# Patient Record
Sex: MALE | Race: BLACK OR AFRICAN AMERICAN | NOT HISPANIC OR LATINO | Employment: FULL TIME | URBAN - METROPOLITAN AREA
[De-identification: names, ages, dates, MRNs, and addresses within clinical notes are randomized per-mention and may not be internally consistent; named-entity substitution may affect disease eponyms.]

---

## 2017-01-13 ENCOUNTER — OFFICE VISIT - HEALTHEAST (OUTPATIENT)
Dept: FAMILY MEDICINE | Facility: CLINIC | Age: 47
End: 2017-01-13

## 2017-01-13 DIAGNOSIS — M19.90 OSTEOARTHRITIS: ICD-10-CM

## 2017-01-13 DIAGNOSIS — J30.9 ALLERGIC RHINITIS: ICD-10-CM

## 2017-01-13 DIAGNOSIS — Z00.00 PHYSICAL EXAM: ICD-10-CM

## 2017-01-13 DIAGNOSIS — E66.3 OVERWEIGHT: ICD-10-CM

## 2017-01-13 LAB
CHOLEST SERPL-MCNC: 170 MG/DL
FASTING STATUS PATIENT QL REPORTED: YES
HDLC SERPL-MCNC: 55 MG/DL
LDLC SERPL CALC-MCNC: 94 MG/DL
TRIGL SERPL-MCNC: 104 MG/DL

## 2017-01-13 ASSESSMENT — MIFFLIN-ST. JEOR: SCORE: 1809.93

## 2017-01-14 ENCOUNTER — COMMUNICATION - HEALTHEAST (OUTPATIENT)
Dept: FAMILY MEDICINE | Facility: CLINIC | Age: 47
End: 2017-01-14

## 2017-01-16 ENCOUNTER — COMMUNICATION - HEALTHEAST (OUTPATIENT)
Dept: FAMILY MEDICINE | Facility: CLINIC | Age: 47
End: 2017-01-16

## 2017-04-10 ENCOUNTER — COMMUNICATION - HEALTHEAST (OUTPATIENT)
Dept: FAMILY MEDICINE | Facility: CLINIC | Age: 47
End: 2017-04-10

## 2017-04-10 ENCOUNTER — AMBULATORY - HEALTHEAST (OUTPATIENT)
Dept: FAMILY MEDICINE | Facility: CLINIC | Age: 47
End: 2017-04-10

## 2017-04-10 DIAGNOSIS — Z88.9 MULTIPLE ALLERGIES: ICD-10-CM

## 2017-09-12 ENCOUNTER — COMMUNICATION - HEALTHEAST (OUTPATIENT)
Dept: SCHEDULING | Facility: CLINIC | Age: 47
End: 2017-09-12

## 2018-01-23 ENCOUNTER — OFFICE VISIT - HEALTHEAST (OUTPATIENT)
Dept: FAMILY MEDICINE | Facility: CLINIC | Age: 48
End: 2018-01-23

## 2018-01-23 DIAGNOSIS — J30.9 ALLERGIC RHINITIS: ICD-10-CM

## 2018-01-23 DIAGNOSIS — M19.90 OSTEOARTHRITIS: ICD-10-CM

## 2018-01-23 DIAGNOSIS — Z00.00 PHYSICAL EXAM: ICD-10-CM

## 2018-01-23 LAB
ALBUMIN SERPL-MCNC: 3.8 G/DL (ref 3.5–5)
ALP SERPL-CCNC: 65 U/L (ref 45–120)
ALT SERPL W P-5'-P-CCNC: 17 U/L (ref 0–45)
ANION GAP SERPL CALCULATED.3IONS-SCNC: 6 MMOL/L (ref 5–18)
AST SERPL W P-5'-P-CCNC: 17 U/L (ref 0–40)
BILIRUB SERPL-MCNC: 0.7 MG/DL (ref 0–1)
BUN SERPL-MCNC: 21 MG/DL (ref 8–22)
CALCIUM SERPL-MCNC: 9.2 MG/DL (ref 8.5–10.5)
CHLORIDE BLD-SCNC: 107 MMOL/L (ref 98–107)
CHOLEST SERPL-MCNC: 187 MG/DL
CO2 SERPL-SCNC: 30 MMOL/L (ref 22–31)
CREAT SERPL-MCNC: 1.19 MG/DL (ref 0.7–1.3)
FASTING STATUS PATIENT QL REPORTED: YES
GFR SERPL CREATININE-BSD FRML MDRD: >60 ML/MIN/1.73M2
GLUCOSE BLD-MCNC: 84 MG/DL (ref 70–125)
HDLC SERPL-MCNC: 63 MG/DL
LDLC SERPL CALC-MCNC: 108 MG/DL
POTASSIUM BLD-SCNC: 4.7 MMOL/L (ref 3.5–5)
PROT SERPL-MCNC: 6.5 G/DL (ref 6–8)
SODIUM SERPL-SCNC: 143 MMOL/L (ref 136–145)
TRIGL SERPL-MCNC: 78 MG/DL

## 2018-01-23 ASSESSMENT — MIFFLIN-ST. JEOR: SCORE: 1797.47

## 2018-01-24 ENCOUNTER — COMMUNICATION - HEALTHEAST (OUTPATIENT)
Dept: FAMILY MEDICINE | Facility: CLINIC | Age: 48
End: 2018-01-24

## 2018-02-01 ENCOUNTER — COMMUNICATION - HEALTHEAST (OUTPATIENT)
Dept: FAMILY MEDICINE | Facility: CLINIC | Age: 48
End: 2018-02-01

## 2018-02-22 ENCOUNTER — RECORDS - HEALTHEAST (OUTPATIENT)
Dept: ADMINISTRATIVE | Facility: OTHER | Age: 48
End: 2018-02-22

## 2018-07-11 ENCOUNTER — AMBULATORY - HEALTHEAST (OUTPATIENT)
Dept: FAMILY MEDICINE | Facility: CLINIC | Age: 48
End: 2018-07-11

## 2018-07-11 ENCOUNTER — COMMUNICATION - HEALTHEAST (OUTPATIENT)
Dept: FAMILY MEDICINE | Facility: CLINIC | Age: 48
End: 2018-07-11

## 2018-07-11 DIAGNOSIS — Z88.9 MULTIPLE ALLERGIES: ICD-10-CM

## 2018-08-07 ENCOUNTER — COMMUNICATION - HEALTHEAST (OUTPATIENT)
Dept: FAMILY MEDICINE | Facility: CLINIC | Age: 48
End: 2018-08-07

## 2018-08-07 ENCOUNTER — OFFICE VISIT - HEALTHEAST (OUTPATIENT)
Dept: FAMILY MEDICINE | Facility: CLINIC | Age: 48
End: 2018-08-07

## 2018-08-07 DIAGNOSIS — R53.83 FATIGUE: ICD-10-CM

## 2018-08-07 DIAGNOSIS — R35.0 URINARY FREQUENCY: ICD-10-CM

## 2018-08-07 LAB
ALBUMIN UR-MCNC: NEGATIVE MG/DL
ANION GAP SERPL CALCULATED.3IONS-SCNC: 10 MMOL/L (ref 5–18)
APPEARANCE UR: CLEAR
BILIRUB UR QL STRIP: NEGATIVE
BUN SERPL-MCNC: 13 MG/DL (ref 8–22)
CALCIUM SERPL-MCNC: 9.3 MG/DL (ref 8.5–10.5)
CHLORIDE BLD-SCNC: 102 MMOL/L (ref 98–107)
CO2 SERPL-SCNC: 27 MMOL/L (ref 22–31)
COLOR UR AUTO: YELLOW
CREAT SERPL-MCNC: 1.33 MG/DL (ref 0.7–1.3)
GFR SERPL CREATININE-BSD FRML MDRD: 57 ML/MIN/1.73M2
GLUCOSE BLD-MCNC: 91 MG/DL (ref 70–125)
GLUCOSE UR STRIP-MCNC: NEGATIVE MG/DL
HGB UR QL STRIP: NEGATIVE
KETONES UR STRIP-MCNC: NEGATIVE MG/DL
LEUKOCYTE ESTERASE UR QL STRIP: NEGATIVE
NITRATE UR QL: NEGATIVE
PH UR STRIP: 7 [PH] (ref 5–8)
POTASSIUM BLD-SCNC: 4.3 MMOL/L (ref 3.5–5)
SODIUM SERPL-SCNC: 139 MMOL/L (ref 136–145)
SP GR UR STRIP: 1.01 (ref 1–1.03)
UROBILINOGEN UR STRIP-ACNC: NORMAL

## 2018-08-08 ENCOUNTER — COMMUNICATION - HEALTHEAST (OUTPATIENT)
Dept: FAMILY MEDICINE | Facility: CLINIC | Age: 48
End: 2018-08-08

## 2018-08-08 LAB — BACTERIA SPEC CULT: NO GROWTH

## 2018-08-24 ENCOUNTER — COMMUNICATION - HEALTHEAST (OUTPATIENT)
Dept: FAMILY MEDICINE | Facility: CLINIC | Age: 48
End: 2018-08-24

## 2020-02-07 ENCOUNTER — OFFICE VISIT - HEALTHEAST (OUTPATIENT)
Dept: FAMILY MEDICINE | Facility: CLINIC | Age: 50
End: 2020-02-07

## 2020-02-07 DIAGNOSIS — Z12.5 SCREENING FOR PROSTATE CANCER: ICD-10-CM

## 2020-02-07 DIAGNOSIS — Z00.00 PHYSICAL EXAM: ICD-10-CM

## 2020-02-07 DIAGNOSIS — Z23 NEED FOR VACCINATION: ICD-10-CM

## 2020-02-07 DIAGNOSIS — M16.11 OSTEOARTHRITIS OF RIGHT HIP, UNSPECIFIED OSTEOARTHRITIS TYPE: ICD-10-CM

## 2020-02-07 DIAGNOSIS — Z13.220 SCREENING FOR LIPID DISORDERS: ICD-10-CM

## 2020-02-07 DIAGNOSIS — J30.9 ALLERGIC RHINITIS, UNSPECIFIED SEASONALITY, UNSPECIFIED TRIGGER: ICD-10-CM

## 2020-02-07 ASSESSMENT — MIFFLIN-ST. JEOR: SCORE: 1838.06

## 2020-02-10 ENCOUNTER — COMMUNICATION - HEALTHEAST (OUTPATIENT)
Dept: FAMILY MEDICINE | Facility: CLINIC | Age: 50
End: 2020-02-10

## 2020-02-10 ENCOUNTER — AMBULATORY - HEALTHEAST (OUTPATIENT)
Dept: LAB | Facility: CLINIC | Age: 50
End: 2020-02-10

## 2020-02-10 DIAGNOSIS — Z12.5 SCREENING FOR PROSTATE CANCER: ICD-10-CM

## 2020-02-10 DIAGNOSIS — J30.9 ALLERGIC RHINITIS, UNSPECIFIED SEASONALITY, UNSPECIFIED TRIGGER: ICD-10-CM

## 2020-02-10 DIAGNOSIS — Z00.00 PHYSICAL EXAM: ICD-10-CM

## 2020-02-10 DIAGNOSIS — Z13.220 SCREENING FOR LIPID DISORDERS: ICD-10-CM

## 2020-02-10 LAB
ALBUMIN SERPL-MCNC: 4.1 G/DL (ref 3.5–5)
ALBUMIN UR-MCNC: NEGATIVE MG/DL
ALP SERPL-CCNC: 70 U/L (ref 45–120)
ALT SERPL W P-5'-P-CCNC: 19 U/L (ref 0–45)
ANION GAP SERPL CALCULATED.3IONS-SCNC: 11 MMOL/L (ref 5–18)
APPEARANCE UR: CLEAR
AST SERPL W P-5'-P-CCNC: 19 U/L (ref 0–40)
BACTERIA #/AREA URNS HPF: NORMAL HPF
BILIRUB SERPL-MCNC: 0.7 MG/DL (ref 0–1)
BILIRUB UR QL STRIP: NEGATIVE
BUN SERPL-MCNC: 16 MG/DL (ref 8–22)
CALCIUM SERPL-MCNC: 9.4 MG/DL (ref 8.5–10.5)
CHLORIDE BLD-SCNC: 104 MMOL/L (ref 98–107)
CHOLEST SERPL-MCNC: 196 MG/DL
CO2 SERPL-SCNC: 26 MMOL/L (ref 22–31)
COLOR UR AUTO: YELLOW
CREAT SERPL-MCNC: 1.3 MG/DL (ref 0.7–1.3)
ERYTHROCYTE [DISTWIDTH] IN BLOOD BY AUTOMATED COUNT: 11.8 % (ref 11–14.5)
FASTING STATUS PATIENT QL REPORTED: YES
GFR SERPL CREATININE-BSD FRML MDRD: 58 ML/MIN/1.73M2
GLUCOSE BLD-MCNC: 82 MG/DL (ref 70–125)
GLUCOSE UR STRIP-MCNC: NEGATIVE MG/DL
HCT VFR BLD AUTO: 45 % (ref 40–54)
HDLC SERPL-MCNC: 69 MG/DL
HGB BLD-MCNC: 15.4 G/DL (ref 14–18)
HGB UR QL STRIP: NEGATIVE
KETONES UR STRIP-MCNC: NEGATIVE MG/DL
LDLC SERPL CALC-MCNC: 110 MG/DL
LEUKOCYTE ESTERASE UR QL STRIP: NEGATIVE
MCH RBC QN AUTO: 32.2 PG (ref 27–34)
MCHC RBC AUTO-ENTMCNC: 34.2 G/DL (ref 32–36)
MCV RBC AUTO: 94 FL (ref 80–100)
NITRATE UR QL: NEGATIVE
PH UR STRIP: 6 [PH] (ref 5–8)
PLATELET # BLD AUTO: 197 THOU/UL (ref 140–440)
PMV BLD AUTO: 7.9 FL (ref 7–10)
POTASSIUM BLD-SCNC: 3.9 MMOL/L (ref 3.5–5)
PROT SERPL-MCNC: 6.3 G/DL (ref 6–8)
PSA SERPL-MCNC: 0.8 NG/ML (ref 0–3.5)
RBC # BLD AUTO: 4.78 MILL/UL (ref 4.4–6.2)
RBC #/AREA URNS AUTO: NORMAL HPF
SODIUM SERPL-SCNC: 141 MMOL/L (ref 136–145)
SP GR UR STRIP: 1.02 (ref 1–1.03)
SQUAMOUS #/AREA URNS AUTO: NORMAL LPF
TRIGL SERPL-MCNC: 85 MG/DL
UROBILINOGEN UR STRIP-ACNC: NORMAL
WBC #/AREA URNS AUTO: NORMAL HPF
WBC: 4.1 THOU/UL (ref 4–11)

## 2020-02-11 ENCOUNTER — COMMUNICATION - HEALTHEAST (OUTPATIENT)
Dept: FAMILY MEDICINE | Facility: CLINIC | Age: 50
End: 2020-02-11

## 2020-02-11 LAB — BACTERIA SPEC CULT: NO GROWTH

## 2020-10-22 ENCOUNTER — COMMUNICATION - HEALTHEAST (OUTPATIENT)
Dept: INTERNAL MEDICINE | Facility: CLINIC | Age: 50
End: 2020-10-22

## 2020-10-22 DIAGNOSIS — Z23 NEED FOR INFLUENZA VACCINATION: ICD-10-CM

## 2020-10-22 DIAGNOSIS — Z23 NEED FOR SHINGLES VACCINE: ICD-10-CM

## 2020-11-10 ENCOUNTER — OFFICE VISIT - HEALTHEAST (OUTPATIENT)
Dept: FAMILY MEDICINE | Facility: CLINIC | Age: 50
End: 2020-11-10

## 2020-11-10 DIAGNOSIS — Z23 NEED FOR VACCINATION: ICD-10-CM

## 2020-11-10 DIAGNOSIS — R10.30 LOWER ABDOMINAL PAIN: ICD-10-CM

## 2021-04-02 ENCOUNTER — OFFICE VISIT - HEALTHEAST (OUTPATIENT)
Dept: FAMILY MEDICINE | Facility: CLINIC | Age: 51
End: 2021-04-02

## 2021-04-02 DIAGNOSIS — M16.11 OSTEOARTHRITIS OF RIGHT HIP, UNSPECIFIED OSTEOARTHRITIS TYPE: ICD-10-CM

## 2021-04-02 DIAGNOSIS — Z12.5 SCREENING FOR PROSTATE CANCER: ICD-10-CM

## 2021-04-02 DIAGNOSIS — H61.23 BILATERAL IMPACTED CERUMEN: ICD-10-CM

## 2021-04-02 DIAGNOSIS — J30.9 ALLERGIC RHINITIS, UNSPECIFIED SEASONALITY, UNSPECIFIED TRIGGER: ICD-10-CM

## 2021-04-02 DIAGNOSIS — Z00.00 PHYSICAL EXAM: ICD-10-CM

## 2021-04-02 DIAGNOSIS — Z13.220 SCREENING FOR LIPID DISORDERS: ICD-10-CM

## 2021-04-02 RX ORDER — EPINEPHRINE 0.3 MG/.3ML
0.3 INJECTION SUBCUTANEOUS PRN
Qty: 2 | Refills: 1 | Status: SHIPPED | OUTPATIENT
Start: 2021-04-02 | End: 2022-06-30

## 2021-04-02 ASSESSMENT — MIFFLIN-ST. JEOR: SCORE: 1821.05

## 2021-04-08 ENCOUNTER — AMBULATORY - HEALTHEAST (OUTPATIENT)
Dept: LAB | Facility: CLINIC | Age: 51
End: 2021-04-08

## 2021-04-08 DIAGNOSIS — Z13.220 SCREENING FOR LIPID DISORDERS: ICD-10-CM

## 2021-04-08 DIAGNOSIS — Z12.5 SCREENING FOR PROSTATE CANCER: ICD-10-CM

## 2021-04-08 LAB
ANION GAP SERPL CALCULATED.3IONS-SCNC: 10 MMOL/L (ref 5–18)
BUN SERPL-MCNC: 12 MG/DL (ref 8–22)
CALCIUM SERPL-MCNC: 8.5 MG/DL (ref 8.5–10.5)
CHLORIDE BLD-SCNC: 104 MMOL/L (ref 98–107)
CHOLEST SERPL-MCNC: 180 MG/DL
CO2 SERPL-SCNC: 27 MMOL/L (ref 22–31)
CREAT SERPL-MCNC: 1.37 MG/DL (ref 0.7–1.3)
FASTING STATUS PATIENT QL REPORTED: YES
GFR SERPL CREATININE-BSD FRML MDRD: 55 ML/MIN/1.73M2
GLUCOSE BLD-MCNC: 100 MG/DL (ref 70–125)
HDLC SERPL-MCNC: 59 MG/DL
LDLC SERPL CALC-MCNC: 100 MG/DL
POTASSIUM BLD-SCNC: 4.5 MMOL/L (ref 3.5–5)
PSA SERPL-MCNC: 0.8 NG/ML (ref 0–3.5)
SODIUM SERPL-SCNC: 141 MMOL/L (ref 136–145)
TRIGL SERPL-MCNC: 106 MG/DL

## 2021-04-09 ENCOUNTER — AMBULATORY - HEALTHEAST (OUTPATIENT)
Dept: FAMILY MEDICINE | Facility: CLINIC | Age: 51
End: 2021-04-09

## 2021-04-09 ENCOUNTER — COMMUNICATION - HEALTHEAST (OUTPATIENT)
Dept: FAMILY MEDICINE | Facility: CLINIC | Age: 51
End: 2021-04-09

## 2021-04-09 DIAGNOSIS — R79.89 ELEVATED SERUM CREATININE: ICD-10-CM

## 2021-05-30 VITALS — BODY MASS INDEX: 27.77 KG/M2 | WEIGHT: 205 LBS | HEIGHT: 72 IN

## 2021-05-31 VITALS — WEIGHT: 204 LBS | HEIGHT: 71 IN | BODY MASS INDEX: 28.56 KG/M2

## 2021-06-01 VITALS — WEIGHT: 208.2 LBS | BODY MASS INDEX: 29.04 KG/M2

## 2021-06-04 VITALS
DIASTOLIC BLOOD PRESSURE: 80 MMHG | HEART RATE: 56 BPM | BODY MASS INDEX: 28.78 KG/M2 | WEIGHT: 209.3 LBS | SYSTOLIC BLOOD PRESSURE: 133 MMHG

## 2021-06-04 VITALS
WEIGHT: 211.2 LBS | HEART RATE: 62 BPM | BODY MASS INDEX: 28.61 KG/M2 | HEIGHT: 72 IN | SYSTOLIC BLOOD PRESSURE: 128 MMHG | DIASTOLIC BLOOD PRESSURE: 83 MMHG

## 2021-06-05 VITALS
BODY MASS INDEX: 29.29 KG/M2 | OXYGEN SATURATION: 98 % | DIASTOLIC BLOOD PRESSURE: 72 MMHG | HEART RATE: 61 BPM | WEIGHT: 209.2 LBS | SYSTOLIC BLOOD PRESSURE: 110 MMHG | HEIGHT: 71 IN

## 2021-06-05 NOTE — PROGRESS NOTES
MALE PREVENTATIVE EXAM    Assessment and Plan:       1. Physical exam  The patient overall has very good health habits.  - Urinalysis Macro & Micro; Future  - Culture, Urine; Future  - HM2(CBC w/o Differential); Future    2. Allergic rhinitis  Seasonal allergies well controlled with OTC antihistamines.    3. Osteoarthritis of right hip  Patient is status post right total hip arthroplasty overall he is doing very well.    4. Screening for prostate cancer     - PSA (Prostatic-Specific Antigen), Annual Screen; Future    5. Need for vaccination     - Influenza, Recombinant, Inj, Quadrivalent, PF, 18+YRS    6. Screening for lipid disorders     - Comprehensive Metabolic Panel; Future  - Lipid Cascade; Future    PLAN:  1.  Urine and laboratory studies as above.  2.  Influenza vaccine.  3.  Patient otherwise should be seen yearly.      Next follow up:  No follow-ups on file.    Immunization Review  Adult Imm Review: Due today, orders placed    I discussed the following with the patient:   Adult Healthy Living: Importance of regular exercise  Healthy nutrition        Subjective:   Chief Complaint: Roger Guillaume is an 49 y.o. male here for a preventative health visit.     HPI:  Roger had a colonoscopy in 2/22/2018. He had a right hip replacement in 2015 which he states greatly aided his improvement.  Was able to continue playing basketball and remain active. Patient does have trouble wiith allergies which he takes Zyrtec for. States that he has an EpiPen which he takes on vacation but does not carry around the [facility] and the [facility]. Did not come fasting and so will return another day for lab work. Roger is interested in receiving the flu shot today. Asked about the shingles shot which, after discussion regarding insurance, he may get at a local pharmacy. Does not currently use glasses or readers but feels that he may need them soon. Denies any significant hearing issues. Mentioned receiving coritsone injections in his right knee but  "after hip replacement no longer experienced any knee pain.    Healthy Habits  Are you taking a daily aspirin? No  Do you typically exercising at least 40 min, 3-4 times per week?  Yes  Do you usually eat at least 4 servings of fruit and vegetables a day, include whole grains and fiber and avoid regularly eating high fat foods? Yes  Have you had an eye exam in the past two years? NO  Do you see a dentist twice per year? NO  Do you have any concerns regarding sleep? No    Safety Screen  If you own firearms, are they secured in a locked gun cabinet or with trigger locks? The patient does not own any firearms  Do you feel you are safe where you are living?: Yes (2/7/2020  3:21 PM)  Do you feel you are safe in your relationship(s)?: Yes (2/7/2020  3:21 PM)      Review of Systems:  Please see above.  The rest of the review of systems are negative for all systems.     Cancer Screening       Status Date      COLONOSCOPY Next Due 2/22/2023      Done 2/22/2018      Patient has more history with this topic...          Patient Care Team:  Richard Howard MD as PCP - General  Richard Howard MD as Assigned PCP      PFSH  Has started a new job which he enjoys very much. He continues to be active. Patient's mom had cancer.     History     Reviewed By Date/Time Sections Reviewed    Richard Howard MD 2/7/2020  3:26 PM Social Documentation    Richard Howard MD 2/7/2020  3:25 PM Medical, Surgical, Tobacco, Alcohol, Drug Use, Sexual Activity, Family, Socioeconomic, Lifestyle, Relationships    Raina Cornejo CMA 2/7/2020  3:21 PM Tobacco            Objective:   Vital Signs:   Visit Vitals  /83   Pulse 62   Ht 5' 11.5\" (1.816 m)   Wt 211 lb 3.2 oz (95.8 kg)   BMI 29.05 kg/m           PHYSICAL EXAM  Constitutional:   Reveals a healthy appearing male.  Vitals: per nursing notes.  HEENT: Right Ear: External ear normal.   Left Ear: External ear normal.   Nose: Nose normal.   Mouth/Throat: Oropharynx is clear and moist. "   Eyes: Conjunctivae and EOM are normal. Pupils are equal, round, and reactive to light. Right eye exhibits no discharge. Left eye exhibits no discharge.   Neck:  Supple, no carotid bruits or adenopathy.  Back:  No spine or CVA pain.  Thorax:  No bony deformities.  Lungs: Clear to A&P without rales or wheezes.  Respiratory effort normal.  Cardiac:   Regular rate and rhythm, normal S1, S2, no murmur or gallop.  Abdomen:  Soft, active bowel sounds without bruits, mass, or tenderness.  Extremities:   No peripheral edema, pulses in the feet intact.    Skin:  No jaundice, peripheral cyanosis or lesions to suggest malignancy.  Neuro:  Alert and oriented. Cranial nerves, motor, sensory exams are intact.  No gross focal deficits.  Psychiatric:  Memory intact, mood appropriate.    The 10-year ASCVD risk score ([place] MARVIN Jr., et al., 2013) is: 5.1%    Values used to calculate the score:      Age: 49 years      Sex: Male      Is Non- : Yes      Diabetic: No      Tobacco smoker: No      Systolic Blood Pressure: 128 mmHg      Is BP treated: No      HDL Cholesterol: 63 mg/dL      Total Cholesterol: 187 mg/dL         Medication List      as of February 7, 2020  4:14 PM     You have not been prescribed any medications.         Additional Screenings Completed Today:       INalini am scribing for and in the presence of, Dr. Howard.    IDr. Howard, personally performed the services described in this documentation, as scribed by Nalini Maria in my presence, and it is both accurate and complete.

## 2021-06-06 NOTE — TELEPHONE ENCOUNTER
Order pended for Epi Pen, I don't see a letter for patient's lab results yet. Are you able to advise?

## 2021-06-08 NOTE — PROGRESS NOTES
ASSESSMENT:  1. Physical exam  Overall the patient has very good health habits.  - Lipid Cascade  - Glucose; Future  - Glucose    2. Osteoarthritis  Patient had arthritis changes right knee, now status post right total knee arthroplasty doing well.    3. Allergic rhinitis  Patient has had workup by allergy in the past.  Not currently significant issue, he does have some concern about anaphylactic reaction to bees, sensitization has been discussed.       PLAN:  1.  Influenza vaccine.  2.  Laboratory studies as above.  3.  Patient will consider desensitization therapy, may see Dr. Aj again an allergy.  4.  See again in one year.    Orders Placed This Encounter   Procedures     Influenza, Seasonal,Quad Inj, 36+ MOS     Lipid Cascade     Order Specific Question:   Fasting is required?     Answer:   Yes     Glucose     Standing Status:   Future     Number of Occurrences:   1     Standing Expiration Date:   1/13/2018     Medications Discontinued During This Encounter   Medication Reason     triamcinolone (KENALOG) 0.1 % ointment Therapy completed       No Follow-up on file.    CHIEF COMPLAINT:  Chief Complaint   Patient presents with     Annual Exam     pt is fasting, NEEDS: flu vacc        SUBJECTIVE:  Roger is a 46 y.o. male presenting to the clinic today for a physical exam. He is fasting today.     Health maintenance: He inquires about colon cancer and prostate screening. Due to his mother's history of bile duct cancer, he inquires if he should start his cancer screening early. He is due for a flu shot.     REVIEW OF SYSTEMS:   He had total right hip replacement on 12/30/2015, which he reports went well. He has not had any restrictions following surgery, and notes increased range of motion of the hip. He was active prior to hip surgery, with walking and basketball, but feels even more active after his surgery. He is not having any joint pain currently. He used to have cortisone injections in his right knee, but since  his hip surgery, he has not had any knee pain.     He experiences seasonal allergies. He also notes a severe allergy to bee stings. Desensitization was discussed, but he has not yet pursued this. His wife is a family physician, and he inquires if she would be able to administer some of his allergy shots at home.   He denies changes in vision or hearing. He denies changes in bowel or bladder habits. He does not have history of asthma or breathing problems.     All other systems are negative.    PFSH:  He is very active, and has access to a standing desk at work.   Immunization History   Administered Date(s) Administered     DT (pediatric) 01/01/1995, 02/13/2006     Influenza, inj, historic 09/22/2009, 01/08/2013     Influenza, seasonal,quad inj 36+ mos 12/22/2014, 01/13/2017     Influenza, seasonal,quad inj 6-35 mos 11/01/2013     Influenza,seasonal quad, PF, 36+MOS 12/15/2015     Td, historic 02/13/2006     Tdap 07/08/2011     Social History     Social History     Marital status:      Spouse name: N/A     Number of children: 4     Years of education: N/A     Occupational History     IT. Assistant Commisioner      [employer]     Social History Main Topics     Smoking status: Never Smoker     Smokeless tobacco: Never Used     Alcohol use No     Drug use: Not on file     Sexual activity: Yes     Partners: Female     Other Topics Concern     Not on file     Social History Narrative    Diet- Regular. He drinks 1 cup of coffee per day.         Exercise- Walking, basketball, jogging. He walks daily and jogs 3 times per week.         Wife is family medicine doctor     Past Medical History   Diagnosis Date     Torn ligament      left ankle, resolved; ankle surgery     Family History   Problem Relation Age of Onset     Cancer Mother      Bile Duct. Dx 50s   He does not know his biological father.   One of his sons just finished medical school and is starting his residency.     MEDICATIONS:  No current outpatient  "prescriptions on file.     No current facility-administered medications for this visit.        TOBACCO USE:  History   Smoking Status     Never Smoker   Smokeless Tobacco     Never Used       VITALS:  Vitals:    01/13/17 0757   BP: 110/70   Pulse: 74   Weight: 205 lb (93 kg)   Height: 5' 11.5\" (1.816 m)     Wt Readings from Last 3 Encounters:   01/13/17 205 lb (93 kg)   12/15/15 209 lb (94.8 kg)   07/07/15 207 lb (93.9 kg)       PHYSICAL EXAM:  Constitutional:   Reveals an alert, pleasant, talkative, middle aged male.  Vitals: per nursing notes.  HEENT: Right Ear: External ear normal.   Left Ear: External ear normal.   Nose: Nose normal.   Mouth/Throat: Oropharynx is clear and moist.   Eyes: Conjunctivae and EOM are normal. Pupils are equal, round, and reactive to light. Right eye exhibits no discharge. Left eye exhibits no discharge.   Neck:  Supple, no carotid bruits or adenopathy.  Back:  No spine or CVA pain.  Thorax:  No bony deformities.  Lungs: Clear to A&P without rales or wheezes.  Respiratory effort normal.  Cardiac:   Regular rate and rhythm, normal S1, S2, no murmur or gallop.  Skin:  No jaundice, peripheral cyanosis or lesions to suggest malignancy.  Neuro:  Alert and oriented. Cranial nerves, motor, sensory exams are intact.  No gross focal deficits.  Psychiatric:  Memory intact, mood appropriate.      DATA REVIEWED:  Additional History from Old Records Summarized (2): Reviewed physical exam from 12/15/15 regarding right hip osteoarthritis.   Decision to Obtain Records (1): None  Radiology Tests Summarized or Ordered (1): None  Labs Reviewed or Ordered (1): Reviewed labs from 12/15/15. Ordered labs today.   Medicine Test Summarized or Ordered (1): None  Independent Review of EKG, X-RAY, or RAPID STREP (2 each): None    The visit lasted a total of 18 minutes face to face with the patient. Over 50% of the time was spent counseling and educating the patient about health maintenance.    Che REYES " Heraclio, am scribing for and in the presence of, Dr. Howard.    I, Dr. Howard, personally performed the services described in this documentation, as scribed by Che Pulliam in my presence, and it is both accurate and complete.      Total data points: 3

## 2021-06-12 NOTE — PROGRESS NOTES
ASSESSMENT:  1. Lower abdominal pain  1 month of intermittent recurrent lower abdominal discomfort.  Possibly a prostatitis, other possibilities include a simple muscle strain, a sports hernia is possible though if so this is minimally symptomatic.     - sulfamethoxazole-trimethoprim (BACTRIM DS) 800-160 mg per tablet; Take 1 tablet by mouth 2 (two) times a day for 10 days.  Dispense: 20 tablet; Refill: 0    2. Need for vaccination     - Influenza, Recombinant, Inj, Quadrivalent, PF, 18+YRS  - Varicella Zoster, Recombinant Vaccine IM  - Varicella Zoster, Recombinant Vaccine IM; Future        PLAN:  1.  Empiric treatment for prostate infection with Bactrim double strength 1 tablet twice daily x10 days.  2.  The patient continues to have pain or discomfort despite this, watchful waiting versus potential imaging studies.  3.  Influenza vaccine.  4.  Begin the shingles vaccine, first dose today future order also placed.  5.  Patient be due for a physical winter spring 2021.    Orders Placed This Encounter   Procedures     Influenza, Recombinant, Inj, Quadrivalent, PF, 18+YRS     Varicella Zoster, Recombinant Vaccine IM     Varicella Zoster, Recombinant Vaccine IM     Standing Status:   Future     Standing Expiration Date:   5/10/2021     There are no discontinued medications.    No follow-ups on file.    CHIEF COMPLAINT:  Chief Complaint   Patient presents with     Abdominal Pain     for about 3-4 weeks. not sure if he has a hernia      Flu Vaccine     would like a flu shot since hes here        SUBJECTIVE:  Roger is a 50 y.o. male who comes in with about a month history of lower abdominal pain perhaps discomfort is the better word.  Patient is very physically active doing sit ups crunches running and so forth, but no change in his usual workout.  No specific injury cannot point to any 1 specific day or event.  But he notices some lower abdominal discomfort more when he does certain motions or maneuvers such as doing  sit ups or sometimes while running.    He has not had any change whatsoever in his bowel habits though he does note at times he has some constipation, there is no association with meals, he is not had any change in his bladder habits.    He does mention that he has had several episodes in the past of presumed prostatitis I actually have treated him empirically for this which has cleared up symptoms, this is a possibility we also discussed the possibility that this is simple muscle strain.  He is not taking anything for this.    He did have an episode of shingles this summer diagnosed by his wife who is a physician on the left upper back, lesions cleared after several weeks.    REVIEW OF SYSTEMS:      All other systems are negative.    PFSH:  Immunization History   Administered Date(s) Administered     DT (pediatric) 01/01/1995, 02/13/2006     INFLUENZA,RECOMBINANT,INJ,PF QUADRIVALENT 18+YRS 02/07/2020, 11/10/2020     INFLUENZA,SEASONAL QUAD, PF, =/> 6months 12/15/2015     Influenza, inj, historic,unspecified 09/22/2009, 01/08/2013, 12/01/2015, 01/13/2017, 10/05/2017     Influenza, seasonal,quad inj 6-35 mos 11/01/2013     Influenza,seasonal, Inj IIV3 09/22/2009, 01/08/2013, 11/01/2013, 12/22/2014     Influenza,seasonal,quad inj =/> 6months 12/22/2014, 01/13/2017     Td, adult adsorbed, PF 02/13/2006     Td,adult,historic,unspecified 02/13/2006     Tdap 07/08/2011     ZOSTER, RECOMBINANT, IM 11/10/2020     Social History     Socioeconomic History     Marital status:      Spouse name: Not on file     Number of children: 4     Years of education: Not on file     Highest education level: Not on file   Occupational History     Occupation: IT. Assistant Commisioner     Comment: [employer]   Social Needs     Financial resource strain: Not on file     Food insecurity     Worry: Not on file     Inability: Not on file     Transportation needs     Medical: Not on file     Non-medical: Not on file   Tobacco Use     Smoking  status: Never Smoker     Smokeless tobacco: Never Used   Substance and Sexual Activity     Alcohol use: No     Drug use: No     Sexual activity: Yes     Partners: Female   Lifestyle     Physical activity     Days per week: Not on file     Minutes per session: Not on file     Stress: Not on file   Relationships     Social connections     Talks on phone: Not on file     Gets together: Not on file     Attends [employer] service: Not on file     Active member of club or organization: Not on file     Attends meetings of clubs or organizations: Not on file     Relationship status: Not on file     Intimate partner violence     Fear of current or ex partner: Not on file     Emotionally abused: Not on file     Physically abused: Not on file     Forced sexual activity: Not on file   Other Topics Concern     Not on file   Social History Narrative    Diet- Regular. He drinks 1 cup of coffee per day.         Exercise- Walking, basketball, jogging. He walks daily and jogs 3 times per week.         Wife is family medicine doctor     Past Medical History:   Diagnosis Date     Shingles 2020    Left upper back, no sequela     Torn ligament     left ankle, resolved; ankle surgery     Family History   Problem Relation Age of Onset     Cancer Mother         Bile Duct. Dx 50s       MEDICATIONS:  Current Outpatient Medications   Medication Sig Dispense Refill     EPINEPHrine (EPIPEN/ADRENACLICK/AUVI-Q) 0.3 mg/0.3 mL injection Inject 0.3 mL (0.3 mg total) as directed as needed for anaphylaxis. Inject into thigh. 2 Pre-filled Pen Syringe 1     sulfamethoxazole-trimethoprim (BACTRIM DS) 800-160 mg per tablet Take 1 tablet by mouth 2 (two) times a day for 10 days. 20 tablet 0     No current facility-administered medications for this visit.        TOBACCO USE:  Social History     Tobacco Use   Smoking Status Never Smoker   Smokeless Tobacco Never Used       VITALS:  Vitals:    11/10/20 0748   BP: 133/80   Pulse: (!) 56   Weight: 209 lb 4.8 oz  (94.9 kg)     Wt Readings from Last 3 Encounters:   11/10/20 209 lb 4.8 oz (94.9 kg)   02/07/20 211 lb 3.2 oz (95.8 kg)   08/07/18 208 lb 3.2 oz (94.4 kg)       PHYSICAL EXAM:  Constitutional:   Reveals a who appears overall healthy.  Vitals: per nursing notes.    Eyes:  EOMs full, PERRL.  Musculoskeletal: No peripheral swelling.  Neuro:  Alert and oriented. Cranial nerves, motor, sensory exams are intact.  No gross focal deficits.  Psychiatric:  Memory intact, mood appropriate.  Abdominal exam.  I do not reproduce any pain or tenderness in the lower abdominal area and the area in question I do not feel any masses.  Of note when I had the patient stand I do not see any evidence of a hernia.    QUALITY MEASURES:      DATA REVIEWED:

## 2021-06-15 NOTE — PROGRESS NOTES
ASSESSMENT:  1. Physical exam  Patient overall has very good health habits.  - Comprehensive Metabolic Panel  - Lipid Doon  - Ambulatory referral for Colonoscopy    2. Osteoarthritis  Patient had significant osteoarthritis right hip, now status post right hip arthroplasty doing well.    3. Allergic rhinitis  Mild, seasonal.      PLAN:  1.  Laboratory studies as above.  2.  Referral for colonoscopy.  3.  Patient otherwise should be seen again in 1 year.       Orders Placed This Encounter   Procedures     Comprehensive Metabolic Panel     Lipid Cascade     Order Specific Question:   Fasting is required?     Answer:   No     Ambulatory referral for Colonoscopy     Referral Priority:   Routine     Referral Type:   Colonoscopy     Referral Reason:   Evaluation and Treatment     Requested Specialty:   Gastroenterology     Number of Visits Requested:   1     There are no discontinued medications.    No Follow-up on file.    CHIEF COMPLAINT:  Chief Complaint   Patient presents with     Annual Exam     pt is fasting        SUBJECTIVE:  Roger is a 47 y.o. male presenting to the clinic for an annual physical. He is fasting today.     Health Maintenance: He has already received the influenza vaccine for the season. He would like to schedule a colonoscopy in the coming year.     REVIEW OF SYSTEMS:   He had osteoarthritis in his right hip and required a surgical replacement. He states that the hip has not bothered him since being replaced and that he does not have arthritic pains in any of his other joints. He has undergone allergy testing and has found that he has a severe bee allergy. He is also allergic to aspirin. He would like to receive desensitization shots. He also experiences seasonal hay fever allergies which he treats with over-the-counter Zyrtec. He used to struggle with heartburn but it has not afflicted him in the recent past. He denies any detriments in his hearing or vision. He has not noticed any changes in  his bladder or bowel habits. He denies a history of asthma or breathing problems.   All other systems are negative.    PFSH:  Surgical: He had his right hip replaced in 2015.   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   Family: His mother passed away of cancer of the bile duct.   Immunization History   Administered Date(s) Administered     DT (pediatric) 01/01/1995, 02/13/2006     Influenza, inj, historic,unspecified 09/22/2009, 01/08/2013, 12/01/2015, 01/13/2017, 10/05/2017     Influenza, seasonal,quad inj 36+ mos 12/22/2014, 01/13/2017     Influenza, seasonal,quad inj 6-35 mos 11/01/2013     Influenza,seasonal quad, PF, 36+MOS 12/15/2015     Influenza,seasonal, Inj IIV3 09/22/2009, 01/08/2013, 11/01/2013, 12/22/2014     Td, adult adsorbed, PF 02/13/2006     Td,adult,historic,unspecified 02/13/2006     Tdap 07/08/2011     Social History     Social History     Marital status:      Spouse name: N/A     Number of children: 4     Years of education: N/A     Occupational History     IT. Assistant Commisioner      [employer]     Social History Main Topics     Smoking status: Never Smoker     Smokeless tobacco: Never Used     Alcohol use No     Drug use: No     Sexual activity: Yes     Partners: Female     Other Topics Concern     Not on file     Social History Narrative    Diet- Regular. He drinks 1 cup of coffee per day.         Exercise- Walking, basketball, jogging. He walks daily and jogs 3 times per week.         Wife is family medicine doctor     Past Medical History:   Diagnosis Date     Torn ligament     left ankle, resolved; ankle surgery     Family History   Problem Relation Age of Onset     Cancer Mother      Bile Duct. Dx 50s       MEDICATIONS:  No current outpatient prescriptions on file.     No current facility-administered medications for this visit.        TOBACCO USE:  History   Smoking Status     Never Smoker   Smokeless Tobacco     Never Used       VITALS:  Vitals:    01/23/18 0742   BP:  "110/70   Pulse: 74   Weight: 204 lb (92.5 kg)   Height: 5' 11\" (1.803 m)     Wt Readings from Last 3 Encounters:   01/23/18 204 lb (92.5 kg)   01/13/17 205 lb (93 kg)   12/15/15 209 lb (94.8 kg)       PHYSICAL EXAM:  Constitutional:   Reveals a pleasant male that appears stated age.  Vitals: per nursing notes.  HEENT: Right Ear: External ear normal.   Left Ear: External ear normal.   Nose: Nose normal.   Mouth/Throat: Oropharynx is clear and moist.   Eyes: Conjunctivae and EOM are normal. Pupils are equal, round, and reactive to light. Right eye exhibits no discharge. Left eye exhibits no discharge.   Neck:  Supple, no carotid bruits or adenopathy.  Back:  No spine or CVA pain.  Thorax:  No bony deformities.  Lungs: Clear to A&P without rales or wheezes.  Respiratory effort normal.  Cardiac:   Regular rate and rhythm, normal S1, S2, no murmur or gallop.  Abdomen:  Soft, active bowel sounds without bruits, mass, or tenderness.  Extremities:   No peripheral edema, pulses in the feet intact.    Skin:  No jaundice, peripheral cyanosis or lesions to suggest malignancy.  Neuro:  Alert and oriented. Cranial nerves, motor, sensory exams are intact.  No gross focal deficits.  Psychiatric:  Memory intact, mood appropriate.    DATA REVIEWED:  Additional History from Old Records Summarized (2): None.   Decision to Obtain Records (1): None.  Radiology Tests Summarized or Ordered (1): None.   Labs Reviewed or Ordered (1): Ordered labs; lipid cascade, comprehensive metabolic panel.   Medicine Test Summarized or Ordered (1): None.   Independent Review of EKG, X-RAY, or RAPID STREP (2 each): None.     The visit lasted a total of 15 minutes face to face with the patient. Over 50% of the time was spent counseling and educating the patient about health maintenance.    Ramiro REYES, am scribing for and in the presence of, Dr. Howard.    Dr. Lee REYES, personally performed the services described in this documentation, as scribed by " Ramiro Guerrero in my presence, and it is both accurate and complete.    Total Data Points: 1

## 2021-06-19 NOTE — PROGRESS NOTES
ASSESSMENT:  1. Urinary frequency  Patient with a sudden change in his overall bladder symptoms, possible prostatitis.  - Urinalysis-UC if Indicated  - Basic Metabolic Panel    2. General Ill  Feeling  - Culture, Urine        PLAN:  1.  Urinalysis reviewed, urine culture pending.  Also basic metabolic profile.  2.  Empiric treatment with Bactrim double strength 1 tablet twice daily ×10 days.  3.  Follow-up as needed.       Orders Placed This Encounter   Procedures     Culture, Urine     Urinalysis-UC if Indicated     Basic Metabolic Panel     Medications Discontinued During This Encounter   Medication Reason     sulfamethoxazole-trimethoprim (BACTRIM DS) 800-160 mg per tablet Reorder       No Follow-up on file.    CHIEF COMPLAINT:  Chief Complaint   Patient presents with     Urinary Frequency     started a couple days ago, fever/chills        SUBJECTIVE:  Roger is a 47 y.o. male who comes in because for the past several days he has not been feeling well.  General ill feeling may be some chills just feeling under the weather almost flulike symptoms.  But is also noticed that he is getting up at night to urinate which is a significant change for him and is not normal.  There is no pain or discomfort with urination per se.    He has had an episode of prostatitis in the past.    He is taken some Tylenol for this but otherwise is been no other change in his health status and overall he is quite healthy.    REVIEW OF SYSTEMS:      All other systems are negative.    PFSH:  Immunization History   Administered Date(s) Administered     DT (pediatric) 01/01/1995, 02/13/2006     Influenza, inj, historic,unspecified 09/22/2009, 01/08/2013, 12/01/2015, 01/13/2017, 10/05/2017     Influenza, seasonal,quad inj 36+ mos 12/22/2014, 01/13/2017     Influenza, seasonal,quad inj 6-35 mos 11/01/2013     Influenza,seasonal quad, PF, 36+MOS 12/15/2015     Influenza,seasonal, Inj IIV3 09/22/2009, 01/08/2013, 11/01/2013, 12/22/2014     Td,  adult adsorbed, PF 02/13/2006     Td,adult,historic,unspecified 02/13/2006     Tdap 07/08/2011     Social History     Social History     Marital status:      Spouse name: N/A     Number of children: 4     Years of education: N/A     Occupational History     IT. Assistant Commisioner      [employer]     Social History Main Topics     Smoking status: Never Smoker     Smokeless tobacco: Never Used     Alcohol use No     Drug use: No     Sexual activity: Yes     Partners: Female     Other Topics Concern     Not on file     Social History Narrative    Diet- Regular. He drinks 1 cup of coffee per day.         Exercise- Walking, basketball, jogging. He walks daily and jogs 3 times per week.         Wife is family medicine doctor     Past Medical History:   Diagnosis Date     Torn ligament     left ankle, resolved; ankle surgery     Family History   Problem Relation Age of Onset     Cancer Mother      Bile Duct. Dx 50s       MEDICATIONS:  Current Outpatient Prescriptions   Medication Sig Dispense Refill     sulfamethoxazole-trimethoprim (BACTRIM DS) 800-160 mg per tablet Take 1 tablet by mouth 2 (two) times a day for 10 days. 20 tablet 0     No current facility-administered medications for this visit.        TOBACCO USE:  History   Smoking Status     Never Smoker   Smokeless Tobacco     Never Used       VITALS:  Vitals:    08/07/18 1126   BP: 122/88   Patient Site: Right Arm   Patient Position: Sitting   Cuff Size: Adult Regular   Pulse: 86   Temp: 98.5  F (36.9  C)   SpO2: 97%   Weight: 208 lb 3.2 oz (94.4 kg)     Wt Readings from Last 3 Encounters:   08/07/18 208 lb 3.2 oz (94.4 kg)   01/23/18 204 lb (92.5 kg)   01/13/17 205 lb (93 kg)       PHYSICAL EXAM:  Constitutional:   Reveals a male who does not appear to be ill.  Vitals: per nursing notes.  Musculoskeletal: No peripheral swelling.  Neuro:  Alert and oriented. Cranial nerves, motor, sensory exams are intact.  No gross focal deficits.  Psychiatric:  Memory  intact, mood appropriate.    QUALITY MEASURES:      DATA REVIEWED:

## 2021-06-20 NOTE — LETTER
Letter by Richard Howard MD at      Author: Richard Howard MD Service: -- Author Type: --    Filed:  Encounter Date: 2/11/2020 Status: (Other)         Roger Guillaume  [address] [facility] [address]             February 11, 2020         Dear Mr. Guillaume,    Below are the results from your recent visit: Sugars good at 82, no diabetes.  Creatinine the kidney test is at the upper limits of normal but still normal at 1.30, was slightly more elevated in the past, I do not think that there is any underlying kidney disorder or disease.    Cholesterol is very well controlled.  The PSA or prostate test is normal.  Urine is normal no evidence of infection.  Blood counts are normal, no anemia.    Resulted Orders   Comprehensive Metabolic Panel   Result Value Ref Range    Sodium 141 136 - 145 mmol/L    Potassium 3.9 3.5 - 5.0 mmol/L    Chloride 104 98 - 107 mmol/L    CO2 26 22 - 31 mmol/L    Anion Gap, Calculation 11 5 - 18 mmol/L    Glucose 82 70 - 125 mg/dL    BUN 16 8 - 22 mg/dL    Creatinine 1.30 0.70 - 1.30 mg/dL    GFR MDRD Af Amer >60 >60 mL/min/1.73m2    GFR MDRD Non Af Amer 58 (L) >60 mL/min/1.73m2    Bilirubin, Total 0.7 0.0 - 1.0 mg/dL    Calcium 9.4 8.5 - 10.5 mg/dL    Protein, Total 6.3 6.0 - 8.0 g/dL    Albumin 4.1 3.5 - 5.0 g/dL    Alkaline Phosphatase 70 45 - 120 U/L    AST 19 0 - 40 U/L    ALT 19 0 - 45 U/L    Narrative    Fasting Glucose reference range is 70-99 mg/dL per  American Diabetes Association (ADA) guidelines.   Lipid Cascade   Result Value Ref Range    Cholesterol 196 <=199 mg/dL    Triglycerides 85 <=149 mg/dL    HDL Cholesterol 69 >=40 mg/dL    LDL Calculated 110 <=129 mg/dL    Patient Fasting > 8hrs? Yes    PSA (Prostatic-Specific Antigen), Annual Screen   Result Value Ref Range    PSA 0.8 0.0 - 3.5 ng/mL    Narrative    Method is Abbott Prostate-Specific Antigen (PSA)  Standard-WHO 1st International (90:10)   Urinalysis Macro & Micro   Result Value Ref Range    Color, UA Yellow Colorless,  Yellow, Straw, Light Yellow    Clarity, UA Clear Clear    Glucose, UA Negative Negative    Bilirubin, UA Negative Negative    Ketones, UA Negative Negative    Specific Gravity, UA 1.025 1.005 - 1.030    Blood, UA Negative Negative    pH, UA 6.0 5.0 - 8.0    Protein, UA Negative Negative mg/dL    Urobilinogen, UA 0.2 E.U./dL 0.2 E.U./dL, 1.0 E.U./dL    Nitrite, UA Negative Negative    Leukocytes, UA Negative Negative    Bacteria, UA None Seen None Seen hpf    RBC, UA 0-2 None Seen, 0-2 hpf    WBC, UA None Seen None Seen, 0-5 hpf    Squam Epithel, UA None Seen None Seen, 0-5 lpf   Culture, Urine   Result Value Ref Range    Culture No Growth    HM2(CBC w/o Differential)   Result Value Ref Range    WBC 4.1 4.0 - 11.0 thou/uL    RBC 4.78 4.40 - 6.20 mill/uL    Hemoglobin 15.4 14.0 - 18.0 g/dL    Hematocrit 45.0 40.0 - 54.0 %    MCV 94 80 - 100 fL    MCH 32.2 27.0 - 34.0 pg    MCHC 34.2 32.0 - 36.0 g/dL    RDW 11.8 11.0 - 14.5 %    Platelets 197 140 - 440 thou/uL    MPV 7.9 7.0 - 10.0 fL            Please call with questions or contact us using [facility].    Sincerely,        Electronically signed by Richard Howard MD

## 2021-06-21 NOTE — LETTER
Letter by Richard Howard MD at      Author: Richard Howard MD Service: -- Author Type: --    Filed:  Encounter Date: 4/9/2021 Status: (Other)         Roger Guillaume  [address] [facility] [address]             April 9, 2021         Dear Mr. Guillaume,    Below are the results from your recent visit: 1 kidney test, creatinine is just slightly elevated, this is probably from mild dehydration in the fasting state, of note it was elevated also several years ago, but to be sure I did put a lab only visit anytime after April 12 for the kidney test to be rechecked.  When you come in you do not need to be fasting I actually would like you to be well-hydrated to see if the kidney tests are not then normalized.    The other kidney tests are normal, sugar is 100 ideally less than 100, continue your very vigorous activity and good diet to help control.  Cholesterol is very well controlled.  The PSA or prostate test is normal.    Resulted Orders   Basic Metabolic Panel   Result Value Ref Range    Sodium 141 136 - 145 mmol/L    Potassium 4.5 3.5 - 5.0 mmol/L    Chloride 104 98 - 107 mmol/L    CO2 27 22 - 31 mmol/L    Anion Gap, Calculation 10 5 - 18 mmol/L    Glucose 100 70 - 125 mg/dL    Calcium 8.5 8.5 - 10.5 mg/dL    BUN 12 8 - 22 mg/dL    Creatinine 1.37 (H) 0.70 - 1.30 mg/dL    GFR MDRD Af Amer >60 >60 mL/min/1.73m2    GFR MDRD Non Af Amer 55 (L) >60 mL/min/1.73m2    Narrative    Fasting Glucose reference range is 70-99 mg/dL per  American Diabetes Association (ADA) guidelines.   Lipid Cascade   Result Value Ref Range    Cholesterol 180 <=199 mg/dL    Triglycerides 106 <=149 mg/dL    HDL Cholesterol 59 >=40 mg/dL    LDL Calculated 100 <=129 mg/dL    Patient Fasting > 8hrs? Yes    PSA (Prostatic-Specific Antigen), Annual Screen   Result Value Ref Range    PSA 0.8 0.0 - 3.5 ng/mL    Narrative    Method is Abbott Prostate-Specific Antigen (PSA)  Standard-WHO 1st International (90:10)            Please call with questions or  contact us using [employer].    Sincerely,        Electronically signed by Richard Hwoard MD

## 2021-06-27 ENCOUNTER — HEALTH MAINTENANCE LETTER (OUTPATIENT)
Age: 51
End: 2021-06-27

## 2021-06-30 NOTE — PROGRESS NOTES
Progress Notes by Richard Howard MD at 4/2/2021 10:40 AM     Author: Richard Howard MD Service: -- Author Type: Physician    Filed: 4/2/2021 12:52 PM Encounter Date: 4/2/2021 Status: Signed    : Richard Howard MD (Physician)       MALE PREVENTATIVE EXAM    Assessment and Plan:     Patient has been advised of split billing requirements and indicates understanding: No    1. Allergic rhinitis  Patient with seasonal allergies generally controlled with OTC medications.  - EPINEPHrine (EPIPEN/ADRENACLICK/AUVI-Q) 0.3 mg/0.3 mL injection; Inject 0.3 mL (0.3 mg total) as directed as needed for anaphylaxis. Inject into thigh.  Dispense: 2 Pre-filled Pen Syringe; Refill: 1    2. Physical exam  Overall the patient has extremely good health habits.    3. Osteoarthritis of right hip  Status post right total hip arthroplasty has done well with.    4. Bilateral impacted cerumen  Probably causing some slight diminishment of hearing    5. Screening for prostate cancer     - PSA (Prostatic-Specific Antigen), Annual Screen; Future    6. Screening for lipid disorders     - Basic Metabolic Panel; Future  - Lipid Cascade; Future    PLAN:  1.  Bilaterally impacted cerumen removed per clinical assistant with usual method of water and hydrogen peroxide irrigation.  2.  Future laboratory studies as above  3.  Patient otherwise should be seen yearly.        Next follow up:  No follow-ups on file.    Immunization Review  Adult Imm Review: No immunizations due today      I discussed the following with the patient:   Adult Healthy Living: Importance of regular exercise  Healthy nutrition        Subjective:   Chief Complaint: Roger Guillaume is an 50 y.o. male here for a preventative health visit.     Patient has been advised of split billing requirements and indicates understanding: No     HPI: Patient reports no significant interval change in his health over the past year.  He has been able to remain very physically active, maintains  a good diet he has overall good health habits he has never been a smoker drinks alcohol in moderation.  He has not had any illness over the past year.    He does have some seasonal allergies, anticipates his allergies will start to flareup soon here in the spring and then again in the fall.  He also does have an EpiPen as needed.    He has noticed that he may not be hearing quite as well he does have some impacted bilateral cerumen which could be impacting his hearing.  He does not wear readers but he thinks he probably will at some point.    Is up-to-date on preventive maintenance issues.  Reviewed the patient's allergies medications active medical problems past medical history family history and social history.    Healthy Habits  Are you taking a daily aspirin? No  Do you typically exercising at least 40 min, 3-4 times per week?  Yes  Do you usually eat at least 4 servings of fruit and vegetables a day, include whole grains and fiber and avoid regularly eating high fat foods? Yes  Have you had an eye exam in the past two years? NO  Do you see a dentist twice per year? NO  Do you have any concerns regarding sleep? No    Safety Screen  If you own firearms, are they secured in a locked gun cabinet or with trigger locks? The patient does not own any firearms  Do you feel you are safe where you are living?: Yes (4/2/2021 10:40 AM)  Do you feel you are safe in your relationship(s)?: Yes (4/2/2021 10:40 AM)      Review of Systems:  Please see above.  The rest of the review of systems are negative for all systems.     Cancer Screening     Patient has no health maintenance due at this time          Patient Care Team:  Richard Howard MD as PCP - General  Richard Howard MD as Assigned PCP        History     Reviewed By Date/Time Sections Reviewed    Richard Howard MD 4/2/2021 10:50 AM Surgical, Family, Social Documentation    Richard Howard MD 4/2/2021 10:49 AM Tobacco, Alcohol, Drug Use, Sexual Activity     "Richard Howard MD 4/2/2021 10:48 AM Medical, Surgical, Family    Jyotsna Sigala N 4/2/2021 10:40 AM Tobacco            Objective:   Vital Signs:   Visit Vitals  /72   Pulse 61   Ht 5' 11\" (1.803 m)   Wt 209 lb 3.2 oz (94.9 kg)   SpO2 98%   BMI 29.18 kg/m           PHYSICAL EXAM  Physical Exam   Constitutional: He is oriented to person, place, and time. He appears well-developed and well-nourished.   HENT:   Head: Normocephalic.   Bilaterally impacted cerumen.   Eyes: Pupils are equal, round, and reactive to light. Conjunctivae and EOM are normal.   Cardiovascular: Normal rate, regular rhythm and normal heart sounds.   Pulmonary/Chest: Effort normal and breath sounds normal.   Musculoskeletal: Normal range of motion.   Neurological: He is alert and oriented to person, place, and time.   Skin: Skin is warm and dry.   Psychiatric: He has a normal mood and affect. His behavior is normal. Judgment and thought content normal.           The 10-year ASCVD risk score (Ave DC Jr., et al., 2013) is: 4%    Values used to calculate the score:      Age: 50 years      Sex: Male      Is Non- : Yes      Diabetic: No      Tobacco smoker: No      Systolic Blood Pressure: 110 mmHg      Is BP treated: No      HDL Cholesterol: 69 mg/dL      Total Cholesterol: 196 mg/dL         Medication List          Accurate as of April 2, 2021 12:51 PM. If you have any questions, ask your nurse or doctor.            CONTINUE taking these medications    EPINEPHrine 0.3 mg/0.3 mL injection  Also known as: EPIPEN/ADRENACLICK/AUVI-Q  INSTRUCTIONS: Inject 0.3 mL (0.3 mg total) as directed as needed for anaphylaxis. Inject into thigh.  Doctor's comments: Please dispense product with lowest cost to patient              Where to Get Your Medications      These medications were sent to [facility]/pharmacy #[id number] - [place], [place] - [address] [place] [address] AT [facility]. [facility]. & [address] [address]    " Phone: [phone]     EPINEPHrine 0.3 mg/0.3 mL injection         Additional Screenings Completed Today:

## 2021-10-17 ENCOUNTER — HEALTH MAINTENANCE LETTER (OUTPATIENT)
Age: 51
End: 2021-10-17

## 2022-04-18 ENCOUNTER — OFFICE VISIT (OUTPATIENT)
Dept: FAMILY MEDICINE | Facility: CLINIC | Age: 52
End: 2022-04-18
Payer: COMMERCIAL

## 2022-04-18 VITALS
DIASTOLIC BLOOD PRESSURE: 60 MMHG | BODY MASS INDEX: 27.77 KG/M2 | HEART RATE: 72 BPM | SYSTOLIC BLOOD PRESSURE: 112 MMHG | HEIGHT: 72 IN | WEIGHT: 205 LBS

## 2022-04-18 DIAGNOSIS — Z11.59 NEED FOR HEPATITIS C SCREENING TEST: ICD-10-CM

## 2022-04-18 DIAGNOSIS — Z00.00 PHYSICAL EXAM: Primary | ICD-10-CM

## 2022-04-18 DIAGNOSIS — Z11.4 SCREENING FOR HIV (HUMAN IMMUNODEFICIENCY VIRUS): ICD-10-CM

## 2022-04-18 DIAGNOSIS — Z13.220 SCREENING FOR LIPID DISORDERS: ICD-10-CM

## 2022-04-18 DIAGNOSIS — Z23 NEED FOR VACCINATION: ICD-10-CM

## 2022-04-18 DIAGNOSIS — Z12.5 SCREENING FOR PROSTATE CANCER: ICD-10-CM

## 2022-04-18 LAB
ALBUMIN UR-MCNC: NEGATIVE MG/DL
ANION GAP SERPL CALCULATED.3IONS-SCNC: 10 MMOL/L (ref 5–18)
APPEARANCE UR: CLEAR
BACTERIA #/AREA URNS HPF: NORMAL /HPF
BILIRUB UR QL STRIP: NEGATIVE
BUN SERPL-MCNC: 17 MG/DL (ref 8–22)
CALCIUM SERPL-MCNC: 9.2 MG/DL (ref 8.5–10.5)
CHLORIDE BLD-SCNC: 105 MMOL/L (ref 98–107)
CHOLEST SERPL-MCNC: 220 MG/DL
CO2 SERPL-SCNC: 27 MMOL/L (ref 22–31)
COLOR UR AUTO: YELLOW
CREAT SERPL-MCNC: 1.38 MG/DL (ref 0.7–1.3)
ERYTHROCYTE [DISTWIDTH] IN BLOOD BY AUTOMATED COUNT: 12.3 % (ref 10–15)
FASTING STATUS PATIENT QL REPORTED: YES
GFR SERPL CREATININE-BSD FRML MDRD: 61 ML/MIN/1.73M2
GLUCOSE BLD-MCNC: 95 MG/DL (ref 70–125)
GLUCOSE UR STRIP-MCNC: NEGATIVE MG/DL
HCT VFR BLD AUTO: 47.3 % (ref 40–53)
HDLC SERPL-MCNC: 70 MG/DL
HGB BLD-MCNC: 15.9 G/DL (ref 13.3–17.7)
HGB UR QL STRIP: NEGATIVE
KETONES UR STRIP-MCNC: NEGATIVE MG/DL
LDLC SERPL CALC-MCNC: 131 MG/DL
LEUKOCYTE ESTERASE UR QL STRIP: NEGATIVE
MCH RBC QN AUTO: 31.6 PG (ref 26.5–33)
MCHC RBC AUTO-ENTMCNC: 33.6 G/DL (ref 31.5–36.5)
MCV RBC AUTO: 94 FL (ref 78–100)
NITRATE UR QL: NEGATIVE
PH UR STRIP: 7 [PH] (ref 5–8)
PLATELET # BLD AUTO: 204 10E3/UL (ref 150–450)
POTASSIUM BLD-SCNC: 4.4 MMOL/L (ref 3.5–5)
PSA SERPL-MCNC: 0.8 UG/L (ref 0–3.5)
RBC # BLD AUTO: 5.03 10E6/UL (ref 4.4–5.9)
RBC #/AREA URNS AUTO: NORMAL /HPF
SODIUM SERPL-SCNC: 142 MMOL/L (ref 136–145)
SP GR UR STRIP: 1.02 (ref 1–1.03)
SQUAMOUS #/AREA URNS AUTO: NORMAL /LPF
TRIGL SERPL-MCNC: 94 MG/DL
UROBILINOGEN UR STRIP-ACNC: 0.2 E.U./DL
WBC # BLD AUTO: 4.1 10E3/UL (ref 4–11)
WBC #/AREA URNS AUTO: NORMAL /HPF

## 2022-04-18 PROCEDURE — 81001 URINALYSIS AUTO W/SCOPE: CPT | Performed by: FAMILY MEDICINE

## 2022-04-18 PROCEDURE — 80048 BASIC METABOLIC PNL TOTAL CA: CPT | Performed by: FAMILY MEDICINE

## 2022-04-18 PROCEDURE — 90715 TDAP VACCINE 7 YRS/> IM: CPT | Performed by: FAMILY MEDICINE

## 2022-04-18 PROCEDURE — 90471 IMMUNIZATION ADMIN: CPT | Performed by: FAMILY MEDICINE

## 2022-04-18 PROCEDURE — 80061 LIPID PANEL: CPT | Performed by: FAMILY MEDICINE

## 2022-04-18 PROCEDURE — 99396 PREV VISIT EST AGE 40-64: CPT | Mod: 25 | Performed by: FAMILY MEDICINE

## 2022-04-18 PROCEDURE — 90750 HZV VACC RECOMBINANT IM: CPT | Performed by: FAMILY MEDICINE

## 2022-04-18 PROCEDURE — 85027 COMPLETE CBC AUTOMATED: CPT | Performed by: FAMILY MEDICINE

## 2022-04-18 PROCEDURE — 90472 IMMUNIZATION ADMIN EACH ADD: CPT | Performed by: FAMILY MEDICINE

## 2022-04-18 PROCEDURE — 36415 COLL VENOUS BLD VENIPUNCTURE: CPT | Performed by: FAMILY MEDICINE

## 2022-04-18 PROCEDURE — G0103 PSA SCREENING: HCPCS | Performed by: FAMILY MEDICINE

## 2022-04-18 NOTE — LETTER
April 19, 2022      Roger Guillaume  [address] [facility] [address]        Dear ,    We are writing to inform you of your test results.  Urine is completely normal.  PSA or prostate test is normal  Overall the cholesterol is well controlled, the total is slightly high only because the HDL so-called good cholesterol is so high but this is overall good.  Blood counts are normal, no anemia  Sugar is good at 95, no diabetes.  Creatinine kidney test is slightly elevated has been several times previously, out  of an abundance of caution going to have you see a nephrologist further evaluation.      Resulted Orders   UA with Microscopic   Result Value Ref Range    Color Urine Yellow Colorless, Straw, Light Yellow, Yellow    Appearance Urine Clear Clear    Glucose Urine Negative Negative mg/dL    Bilirubin Urine Negative Negative    Ketones Urine Negative Negative mg/dL    Specific Gravity Urine 1.020 1.005 - 1.030    Blood Urine Negative Negative    pH Urine 7.0 5.0 - 8.0    Protein Albumin Urine Negative Negative mg/dL    Urobilinogen Urine 0.2 0.2, 1.0 E.U./dL    Nitrite Urine Negative Negative    Leukocyte Esterase Urine Negative Negative   Lipid panel reflex to direct LDL Fasting   Result Value Ref Range    Cholesterol 220 (H) <=199 mg/dL    Triglycerides 94 <=149 mg/dL    Direct Measure HDL 70 >=40 mg/dL      Comment:      HDL Cholesterol Reference Range:     0-2 years:   No reference ranges established for patients under 2 years old  at [employer] for lipid analytes.    2-8 years:  Greater than 45 mg/dL     18 years and older:   Female: Greater than or equal to 50 mg/dL   Male:   Greater than or equal to 40 mg/dL    LDL Cholesterol Calculated 131 (H) <=129 mg/dL    Patient Fasting > 8hrs? Yes    Basic metabolic panel   Result Value Ref Range    Sodium 142 136 - 145 mmol/L    Potassium 4.4 3.5 - 5.0 mmol/L    Chloride 105 98 - 107 mmol/L    Carbon Dioxide (CO2) 27 22 - 31 mmol/L    Anion Gap 10 5  - 18 mmol/L    Urea Nitrogen 17 8 - 22 mg/dL    Creatinine 1.38 (H) 0.70 - 1.30 mg/dL    Calcium 9.2 8.5 - 10.5 mg/dL    Glucose 95 70 - 125 mg/dL    GFR Estimate 61 >60 mL/min/1.73m2      Comment:      Effective December 21, 2021 eGFRcr in adults is calculated using the 2021 CKD-EPI creatinine equation which includes age and gender ([place] garza al., [place], DOI: 10.[facility]6/KEPWgv0306810)   CBC with platelets   Result Value Ref Range    WBC Count 4.1 4.0 - 11.0 10e3/uL    RBC Count 5.03 4.40 - 5.90 10e6/uL    Hemoglobin 15.9 13.3 - 17.7 g/dL    Hematocrit 47.3 40.0 - 53.0 %    MCV 94 78 - 100 fL    MCH 31.6 26.5 - 33.0 pg    MCHC 33.6 31.5 - 36.5 g/dL    RDW 12.3 10.0 - 15.0 %    Platelet Count 204 150 - 450 10e3/uL   PROSTATE SPEC ANTIGEN SCREEN   Result Value Ref Range    Prostate Specific Antigen Screen 0.80 0.00 - 3.50 ug/L    Narrative    Assay Method is Abbott Prostate-Specific Antigen (PSA)  Standard-WHO 1st International (90:10)   Urine Microscopic Exam   Result Value Ref Range    Bacteria Urine None Seen None Seen /HPF    RBC Urine 0-2 0-2 /HPF /HPF    WBC Urine 0-5 0-5 /HPF /HPF    Squamous Epithelials Urine None Seen None Seen /LPF       If you have any questions or concerns, please call the clinic at the number listed above.       Sincerely,      Richard Howard MD

## 2022-04-18 NOTE — PROGRESS NOTES
SUBJECTIVE:   CC: Roger Guillaume is an 51 year old male who presents for preventative health visit.   Patient has been advised of split billing requirements and indicates understanding: Yes       HPI  Patient comes in for his annual physical examination  Patient reports the last several years he actually has been more physically active than he has since his 20s, his diet is excellent, he is due for the second shingles and also tetanus booster he will defer on the fourth COVID-vaccine at this time.  He will get that in the future.    He has had a couple of elevations of creatinine we will recheck that today.  We have been screening for prostate cancer.    Patient does have some left ceruminosis he is not noticed any diminishment in hearing he will defer any irrigation at this time.    Otherwise the patient is doing very well          Healthy Habits:     Getting at least 3 servings of Calcium per day:  Yes    Bi-annual eye exam:  NO    Dental care twice a year:  NO    Sleep apnea or symptoms of sleep apnea:  None    Diet:  Regular (no restrictions)    Frequency of exercise:  6-7 days/week    Duration of exercise:  45-60 minutes    Taking medications regularly:  Not Applicable    Medication side effects:  Not applicable    PHQ-2 Total Score: 0    Additional concerns today:  No              Today's PHQ-2 Score:   PHQ-2 ( 1999 Pfizer) 4/18/2022   Q1: Little interest or pleasure in doing things 0   Q2: Feeling down, depressed or hopeless 0   PHQ-2 Score 0   Q1: Little interest or pleasure in doing things Not at all   Q2: Feeling down, depressed or hopeless Not at all   PHQ-2 Score 0       Abuse: Current or Past(Physical, Sexual or Emotional)- No  Do you feel safe in your environment? Yes    Have you ever done Advance Care Planning? (For example, a Health Directive, POLST, or a discussion with a medical provider or your loved ones about your wishes): No, advance care planning information given to patient to review.  Patient  plans to discuss their wishes with loved ones or provider.      Social History     Tobacco Use     Smoking status: Never Smoker     Smokeless tobacco: Never Used   Substance Use Topics     Alcohol use: Yes     Alcohol/week: 2.0 standard drinks         Alcohol Use 4/18/2022   Prescreen: >3 drinks/day or >7 drinks/week? No       Last PSA:   Prostate Specific Antigen Screen   Date Value Ref Range Status   04/08/2021 0.8 0.0 - 3.5 ng/mL Final       Reviewed orders with patient. Reviewed health maintenance and updated orders accordingly - Yes  Lab work is in process    Reviewed and updated as needed this visit by clinical staff                    Reviewed and updated as needed this visit by Provider                   Past Medical History:   Diagnosis Date     Shingles 2020    Left upper back, no sequela     Torn ligament     left ankle, resolved; ankle surgery      Past Surgical History:   Procedure Laterality Date     ANKLE SURGERY Left     Torn ligaments     TOTAL HIP ARTHROPLASTY Right 12/30/2015     VASECTOMY         Review of Systems  CONSTITUTIONAL: NEGATIVE for fever, chills, change in weight  INTEGUMENTARY/SKIN: NEGATIVE for worrisome rashes, moles or lesions  EYES: NEGATIVE for vision changes or irritation  ENT: NEGATIVE for ear, mouth and throat problems  RESP: NEGATIVE for significant cough or SOB  CV: NEGATIVE for chest pain, palpitations or peripheral edema  GI: NEGATIVE for nausea, abdominal pain, heartburn, or change in bowel habits   male: negative for dysuria, hematuria, decreased urinary stream, erectile dysfunction, urethral discharge  MUSCULOSKELETAL: NEGATIVE for significant arthralgias or myalgia  NEURO: NEGATIVE for weakness, dizziness or paresthesias  PSYCHIATRIC: NEGATIVE for changes in mood or affect    OBJECTIVE:   There were no vitals taken for this visit.    Physical Exam  GENERAL: healthy, alert and no distress  EYES: Eyes grossly normal to inspection, PERRL and conjunctivae and sclerae  normal  HENT: ear canals and TM's normal, nose and mouth without ulcers or lesions  NECK: no adenopathy, no asymmetry, masses, or scars and thyroid normal to palpation  RESP: lungs clear to auscultation - no rales, rhonchi or wheezes  CV: regular rate and rhythm, normal S1 S2, no S3 or S4, no murmur, click or rub, no peripheral edema and peripheral pulses strong  ABDOMEN: soft, nontender, no hepatosplenomegaly, no masses and bowel sounds normal  MS: no gross musculoskeletal defects noted, no edema  SKIN: no suspicious lesions or rashes  NEURO: Normal strength and tone, mentation intact and speech normal  PSYCH: mentation appears normal, affect normal/bright    Diagnostic Test Results:  Labs reviewed in Epic    ASSESSMENT/PLAN:   (Z00.00) Physical exam  (primary encounter diagnosis)  Comment: Overall the patient has extremely good health habits.  Plan:      (Z23) Need for vaccination  Comment:    Plan: ZOSTER VACCINE RECOMBINANT (Shingrix), TDAP         VACCINE (Adacel, Boostrix)  [[id number]]             (Z13.220) Screening for lipid disorders  Comment:    Plan: Lipid panel reflex to direct LDL Fasting, Basic        metabolic panel, CBC with platelets             (Z12.5) Screening for prostate cancer  Comment: No known family history of  Plan: UA with Microscopic, PROSTATE SPEC ANTIGEN         SCREEN             PLAN:  1.  Second shingles vaccine and Tdap today  2.  Urine and laboratory studies as above  3.  Defer fourth COVID shot though he will get in the future.  4.  Left ear cerumen noted, patient defers any irrigation though he could tensely get in the future.  5.  Patient otherwise should be seen yearly      Patient has been advised of split billing requirements and indicates understanding: Yes    COUNSELING:   Reviewed preventive health counseling, as reflected in patient instructions       Regular exercise       Healthy diet/nutrition    Estimated body mass index is 29.18 kg/m  as calculated from the  "following:    Height as of 4/2/21: 1.803 m (5' 11\").    Weight as of 4/2/21: 94.9 kg (209 lb 3.2 oz).     Weight management plan: Discussed healthy diet and exercise guidelines    He reports that he has never smoked. He has never used smokeless tobacco.      Counseling Resources:  ATP IV Guidelines  Pooled Cohorts Equation Calculator  FRAX Risk Assessment  ICSI Preventive Guidelines  Dietary Guidelines for Americans, 2010  USDA's MyPlate  ASA Prophylaxis  Lung CA Screening    Richard Howard MD  [facility]  "

## 2022-04-19 DIAGNOSIS — R79.89 ELEVATED SERUM CREATININE: Primary | ICD-10-CM

## 2022-04-26 ENCOUNTER — TELEPHONE (OUTPATIENT)
Dept: NEPHROLOGY | Facility: CLINIC | Age: 52
End: 2022-04-26
Payer: COMMERCIAL

## 2022-04-26 DIAGNOSIS — R79.89 ELEVATED SERUM CREATININE: Primary | ICD-10-CM

## 2022-04-26 NOTE — TELEPHONE ENCOUNTER
[facility] Call Center    Phone Message    May a detailed message be left on voicemail: yes     Reason for Call: Order(s): Other:   Reason for requested: labs  Date needed: before appt on 08/16/22  Provider name: Jorge      Action Taken: Other: Nephrology    Travel Screening: Not Applicable

## 2022-06-30 ENCOUNTER — TELEPHONE (OUTPATIENT)
Dept: FAMILY MEDICINE | Facility: CLINIC | Age: 52
End: 2022-06-30

## 2022-06-30 DIAGNOSIS — J30.9 ALLERGIC RHINITIS, UNSPECIFIED SEASONALITY, UNSPECIFIED TRIGGER: ICD-10-CM

## 2022-06-30 RX ORDER — EPINEPHRINE 0.3 MG/.3ML
0.3 INJECTION SUBCUTANEOUS PRN
Qty: 2 EACH | Refills: 1 | Status: SHIPPED | OUTPATIENT
Start: 2022-06-30

## 2022-06-30 RX ORDER — EPINEPHRINE 0.3 MG/.3ML
INJECTION SUBCUTANEOUS
Qty: 2 EACH | Refills: 1 | Status: SHIPPED | OUTPATIENT
Start: 2022-06-30 | End: 2023-07-21

## 2022-06-30 NOTE — TELEPHONE ENCOUNTER
Reason for Call:  Medication or medication refill: EPINEPHrine (EPIPEN/ADRENACLICK/AUVI-Q) 0.3 mg/0.3 mL injection     Do you use a [facility]? NO Name of the pharmacy and phone number for the current request: [facility] pharmacy [address] in [place], [phone]    Name of the medication requested:     EPINEPHrine (EPIPEN/ADRENACLICK/AUVI-Q) 0.3 mg/0.3 mL injection 2 1 4/2/2021  No   Sig: [EPINEPHRINE (EPIPEN/ADRENACLICK/AUVI-Q) 0.3 MG/0.3 ML INJECTION] Inject 0.3 mL (0.3 mg total) as directed as needed for anaphylaxis. Inject into thigh.     Other Details: Pt is going up to [place] and will be outside and states he needs his epi pen in case of emergency.     Can we leave a detailed message on this number? YES    Phone number patient can be reached at: Home number on file [phone] (home)    Best Time: ANY    Call taken on 6/30/2022 at 10:09 AM by Jess Young

## 2022-06-30 NOTE — TELEPHONE ENCOUNTER
6-30-22  Reason for Call:   medication refill:    Do you use a [facility]? [facility] pharmacy    Name of the medication requested: EPINEPHrine (EPIPEN/ADRENACLICK/AUVI-Q) 0.3 mg/0.3 mL injection    Other request: pt is up [place] and wants this called into [facility].  Pt states he needs this ASAP in case he has an emergency occur while on vacation.  Pt also is requesting a call back once this has been called in    Can we leave a detailed message on this number? YES    Phone number patient can be reached at: Cell number on file:    Telephone Information:   Mobile [phone]       Best Time: anthime    Call taken on 6/30/2022 at 12:20 PM by Areli Carrero

## 2022-06-30 NOTE — TELEPHONE ENCOUNTER
Closing this encounter. Duplicate. Added additional info to Refill encounter from today 6/30/22.    NELSON CrystalN, RN  [facility]

## 2022-06-30 NOTE — TELEPHONE ENCOUNTER
"Dr Howard,    Please advise.     Patient called back stating, \"Pt states he needs this ASAP in case he has an emergency occur while on vacation.  Pt also is requesting a call back once this has been called in\"    OK to leave detailed message at mobile number [phone].    NELSON CrystalN, RN  [facility]    "

## 2022-08-16 ENCOUNTER — LAB (OUTPATIENT)
Dept: LAB | Facility: CLINIC | Age: 52
End: 2022-08-16
Payer: COMMERCIAL

## 2022-08-16 ENCOUNTER — OFFICE VISIT (OUTPATIENT)
Dept: NEPHROLOGY | Facility: CLINIC | Age: 52
End: 2022-08-16
Attending: INTERNAL MEDICINE
Payer: COMMERCIAL

## 2022-08-16 VITALS
WEIGHT: 209 LBS | BODY MASS INDEX: 28.31 KG/M2 | OXYGEN SATURATION: 99 % | DIASTOLIC BLOOD PRESSURE: 82 MMHG | HEIGHT: 72 IN | SYSTOLIC BLOOD PRESSURE: 122 MMHG | HEART RATE: 57 BPM

## 2022-08-16 DIAGNOSIS — R79.89 ELEVATED SERUM CREATININE: ICD-10-CM

## 2022-08-16 DIAGNOSIS — R31.21 ASYMPTOMATIC MICROSCOPIC HEMATURIA: ICD-10-CM

## 2022-08-16 LAB
ALBUMIN SERPL-MCNC: 3.9 G/DL (ref 3.4–5)
ALBUMIN UR-MCNC: NEGATIVE MG/DL
ANION GAP SERPL CALCULATED.3IONS-SCNC: 4 MMOL/L (ref 3–14)
APPEARANCE UR: CLEAR
BILIRUB UR QL STRIP: NEGATIVE
BUN SERPL-MCNC: 16 MG/DL (ref 7–30)
CALCIUM SERPL-MCNC: 9 MG/DL (ref 8.5–10.1)
CHLORIDE BLD-SCNC: 107 MMOL/L (ref 94–109)
CO2 SERPL-SCNC: 31 MMOL/L (ref 20–32)
COLOR UR AUTO: YELLOW
CREAT SERPL-MCNC: 1.28 MG/DL (ref 0.66–1.25)
CREAT UR-MCNC: 125 MG/DL
CYSTATIN C (ROCHE): 1.1 MG/L (ref 0.6–1)
DEPRECATED CALCIDIOL+CALCIFEROL SERPL-MC: 39 UG/L (ref 20–75)
ERYTHROCYTE [DISTWIDTH] IN BLOOD BY AUTOMATED COUNT: 12.7 % (ref 10–15)
GFR SERPL CREATININE-BSD FRML MDRD: 67 ML/MIN/1.73M2
GFR SERPL CREATININE-BSD FRML MDRD: 70 ML/MIN/1.73M2
GLUCOSE BLD-MCNC: 92 MG/DL (ref 70–99)
GLUCOSE UR STRIP-MCNC: NEGATIVE MG/DL
HCT VFR BLD AUTO: 46.4 % (ref 40–53)
HGB BLD-MCNC: 15.7 G/DL (ref 13.3–17.7)
HGB UR QL STRIP: ABNORMAL
KETONES UR STRIP-MCNC: NEGATIVE MG/DL
LEUKOCYTE ESTERASE UR QL STRIP: NEGATIVE
MCH RBC QN AUTO: 31.4 PG (ref 26.5–33)
MCHC RBC AUTO-ENTMCNC: 33.8 G/DL (ref 31.5–36.5)
MCV RBC AUTO: 93 FL (ref 78–100)
MUCOUS THREADS #/AREA URNS LPF: PRESENT /LPF
NITRATE UR QL: NEGATIVE
PH UR STRIP: 7 [PH] (ref 5–7)
PHOSPHATE SERPL-MCNC: 2.8 MG/DL (ref 2.5–4.5)
PLATELET # BLD AUTO: 199 10E3/UL (ref 150–450)
POTASSIUM BLD-SCNC: 4.5 MMOL/L (ref 3.4–5.3)
PROT UR-MCNC: 0.07 G/L
PROT/CREAT 24H UR: 0.06 G/G CR (ref 0–0.2)
PTH-INTACT SERPL-MCNC: 47 PG/ML (ref 15–65)
RBC # BLD AUTO: 5 10E6/UL (ref 4.4–5.9)
RBC URINE: 3 /HPF
SODIUM SERPL-SCNC: 142 MMOL/L (ref 133–144)
SP GR UR STRIP: 1.01 (ref 1–1.03)
UROBILINOGEN UR STRIP-MCNC: NORMAL MG/DL
WBC # BLD AUTO: 3.3 10E3/UL (ref 4–11)
WBC URINE: 2 /HPF

## 2022-08-16 PROCEDURE — 85027 COMPLETE CBC AUTOMATED: CPT | Performed by: PATHOLOGY

## 2022-08-16 PROCEDURE — 83970 ASSAY OF PARATHORMONE: CPT | Performed by: PATHOLOGY

## 2022-08-16 PROCEDURE — 80069 RENAL FUNCTION PANEL: CPT | Performed by: PATHOLOGY

## 2022-08-16 PROCEDURE — 36415 COLL VENOUS BLD VENIPUNCTURE: CPT | Performed by: PATHOLOGY

## 2022-08-16 PROCEDURE — 82610 CYSTATIN C: CPT | Mod: 90 | Performed by: PATHOLOGY

## 2022-08-16 PROCEDURE — 99204 OFFICE O/P NEW MOD 45 MIN: CPT | Performed by: INTERNAL MEDICINE

## 2022-08-16 PROCEDURE — 99000 SPECIMEN HANDLING OFFICE-LAB: CPT | Performed by: PATHOLOGY

## 2022-08-16 PROCEDURE — 81001 URINALYSIS AUTO W/SCOPE: CPT | Performed by: PATHOLOGY

## 2022-08-16 PROCEDURE — G0463 HOSPITAL OUTPT CLINIC VISIT: HCPCS

## 2022-08-16 PROCEDURE — 82306 VITAMIN D 25 HYDROXY: CPT | Mod: 90 | Performed by: PATHOLOGY

## 2022-08-16 PROCEDURE — 84156 ASSAY OF PROTEIN URINE: CPT | Performed by: PATHOLOGY

## 2022-08-16 ASSESSMENT — PAIN SCALES - GENERAL: PAINLEVEL: NO PAIN (0)

## 2022-08-16 NOTE — PROGRESS NOTES
Nephrology Clinic Note  August 16, 2022    I was asked to see this patient by Dr. Howard    CC: Elevated creatinine    HPI: Roger Guillaume is a 51 year old male with PMH significant for allergies, OA  who presents for evaluation and management of elevated creatinine.     Patient endorsed feeling well today. No new symptoms today.  Presented for creatinine elevation as it has been elevated since 2018.  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  Patient denied any systemic symptoms including longstanding rash on his body and/or small joint aches.  Never saw any blood in his urine,  no trouble urinating.  Upon questioning patient workout daily, will do some cardio and also strengthening exercises, more so since his hip replacement in 2018.  Since that time he denied any drastic weight changes.  Patient denied any over-the-counter medications including NSAIDs and/or high-protein shakes or creatinine supplementations.    No known hypertension or diabetes  No history of tobacco or illicit drug or alcohol use    - History of urinary symptoms: no  - History of Hematuria: no  - Swelling: May be a little at the band of socks  - Hx of UTIs: no  - Hx of stones: no  - Rashes/Joint pain: no  - Family hx of kidney disease: None that he knows of  - NSAID use: no       Allergies   Allergen Reactions     Hymenoptera Allergenic Extract [Wasp Venom Protein] Anaphylaxis     BEE serum, Facial swelling     Aspirin Swelling     Facial swelling, possibly, Can take motrin     Salicylates Swelling and Rash       EPINEPHrine (ANY BX GENERIC EQUIV) 0.3 MG/0.3ML injection 2-pack, INJECT 0.3 ML (0.3 MG TOTAL) AS DIRECTED AS NEEDED FOR ANAPHYLAXIS. INJECT INTO THIGH.  EPINEPHrine (ANY BX GENERIC EQUIV) 0.3 MG/0.3ML injection 2-pack, Inject 0.3 mLs (0.3 mg) into the muscle as needed for anaphylaxis    No current facility-administered medications on file prior to visit.      Past Medical History:    Diagnosis Date     Shingles 2020    Left upper back, no sequela     Torn ligament     left ankle, resolved; ankle surgery       Past Surgical History:   Procedure Laterality Date     ANKLE SURGERY Left     Torn ligaments     TOTAL HIP ARTHROPLASTY Right 12/30/2015     VASECTOMY         Social History     Tobacco Use     Smoking status: Never Smoker     Smokeless tobacco: Never Used   Substance Use Topics     Alcohol use: Yes     Alcohol/week: 2.0 standard drinks     Comment: Rare     Drug use: No       Family History   Problem Relation Age of Onset     Cancer Mother         Bile Duct. Dx 50s       ROS: A 12 system review of systems was negative other than noted here or above.     Exam:  /82   Pulse 57   Ht 1.829 m (6')   Wt 94.8 kg (209 lb)   SpO2 99%   BMI 28.35 kg/m      GENERAL APPEARANCE: alert and no distress  EYES:  no scleral icterus  HENT: No gross abnormality noted  NECK: supple  RESP: lungs clear to auscultation   CV: regular rhythm, normal rate, no rub  Extremities: no clubbing, cyanosis, or edema  SKIN: no rash  NEURO: mentation intact and speech normal  PSYCH: affect normal/bright    Results:    Lab on 08/16/2022   Component Date Value Ref Range Status     Sodium 08/16/2022 142  133 - 144 mmol/L Final     Potassium 08/16/2022 4.5  3.4 - 5.3 mmol/L Final     Chloride 08/16/2022 107  94 - 109 mmol/L Final     Carbon Dioxide (CO2) 08/16/2022 31  20 - 32 mmol/L Final     Anion Gap 08/16/2022 4  3 - 14 mmol/L Final     Urea Nitrogen 08/16/2022 16  7 - 30 mg/dL Final     Creatinine 08/16/2022 1.28 (A) 0.66 - 1.25 mg/dL Final     Calcium 08/16/2022 9.0  8.5 - 10.1 mg/dL Final     Glucose 08/16/2022 92  70 - 99 mg/dL Final     Albumin 08/16/2022 3.9  3.4 - 5.0 g/dL Final     Phosphorus 08/16/2022 2.8  2.5 - 4.5 mg/dL Final     GFR Estimate 08/16/2022 67  >60 mL/min/1.73m2 Final    Effective December 21, 2021 eGFRcr in adults is calculated using the 2021 CKD-EPI creatinine equation which includes  age and gender (Geneva garza al., [facility], DOI: 10.1056/KHWKvs2387084)     WBC Count 08/16/2022 3.3 (A) 4.0 - 11.0 10e3/uL Final     RBC Count 08/16/2022 5.00  4.40 - 5.90 10e6/uL Final     Hemoglobin 08/16/2022 15.7  13.3 - 17.7 g/dL Final     Hematocrit 08/16/2022 46.4  40.0 - 53.0 % Final     MCV 08/16/2022 93  78 - 100 fL Final     MCH 08/16/2022 31.4  26.5 - 33.0 pg Final     MCHC 08/16/2022 33.8  31.5 - 36.5 g/dL Final     RDW 08/16/2022 12.7  10.0 - 15.0 % Final     Platelet Count 08/16/2022 199  150 - 450 10e3/uL Final     Total Protein Random Urine g/L 08/16/2022 0.07  g/L Final    The reference range has not been established for total protein in random urine samples.  The result should be integrated into the clinical context for interpretation.     Total Protein Urine g/gr Creatinine 08/16/2022 0.06  0.00 - 0.20 g/g Cr Final     Creatinine Urine mg/dL 08/16/2022 125  mg/dL Final     Color Urine 08/16/2022 Yellow  Colorless, Straw, Light Yellow, Yellow Final     Appearance Urine 08/16/2022 Clear  Clear Final     Glucose Urine 08/16/2022 Negative  Negative mg/dL Final     Bilirubin Urine 08/16/2022 Negative  Negative Final     Ketones Urine 08/16/2022 Negative  Negative mg/dL Final     Specific Gravity Urine 08/16/2022 1.015  1.003 - 1.035 Final     Blood Urine 08/16/2022 Trace (A) Negative Final     pH Urine 08/16/2022 7.0  5.0 - 7.0 Final     Protein Albumin Urine 08/16/2022 Negative  Negative mg/dL Final     Urobilinogen Urine 08/16/2022 Normal  Normal, 2.0 mg/dL Final     Nitrite Urine 08/16/2022 Negative  Negative Final     Leukocyte Esterase Urine 08/16/2022 Negative  Negative Final     Mucus Urine 08/16/2022 Present (A) None Seen /LPF Final     RBC Urine 08/16/2022 3 (A) <=2 /HPF Final     WBC Urine 08/16/2022 2  <=5 /HPF Final     Parathyroid Hormone Intact 08/16/2022 47  15 - 65 pg/mL Final       Assessment/Plan:     Creatinine elevation with out diagnosis of CKD  Pt with baseline creatinine of 1.2 to 1.3  since 2018(in care every where as well) with eGFR of high 50's to low 60's. UA was bland so far, but noted to have few RBC in sample that is collected today, UPCR was WNL. No renal imaiging noted so far.  With no significant PMH including hypertension or diabetes or chronic NSAID use and possible increased muscle mass since 2018, considering increased generation than true decrease in creatinine clearance. That said, his UA did had very low level RBC at 3 (combined with mucus, ? Contamination),  Will repeat his labs, if persistent will get serological evaluation for possible underlying GN  - added Cystatin C levels, to compare it with creatinine estimated eGFR,  - will consider 24 hour creatinine clearance based on his cystatin C levels  - will get renal imaging to rule out possible anatomical abnormalities.  - encouraged to be well hydrated  - increase in fresh fruits and vegetables  - continue being physically active    Hypertension :  BP were WNL, not on meds, continue to monitor    Electrolytes :  Stable    BMD :  Goldy, phos, PTH are WNL, awaiting vit D levels    Metabolic acidosis :  Bicarb is  WNL    Anemia :  Hb is WNL    Other problems  Allergies   Anaphylaxis 2/2 bee venous, epi as needed, follow up with PCP.    Total time spent on the day of clinic visit was 45 min including  Face to face time of 18 min, lab and image review, care every where record review, PCP note review and documentation as above.    Suzette Patel  Dept of Nephrology and Hypertension  [facility]

## 2022-08-16 NOTE — NURSING NOTE
Chief Complaint   Patient presents with     New Patient     First time visit set up care     /82   Pulse 57   Ht 1.829 m (6')   Wt 94.8 kg (209 lb)   SpO2 99%   BMI 28.35 kg/m    Juani Hayes MA on 8/16/2022 at 8:43 AM

## 2022-08-16 NOTE — LETTER
8/16/2022       RE: Roger Guillaume  [id number] [facility] [address]     Dear Colleague,    Thank you for referring your patient, Roger Guillaume, to the [facility] [place] at [facility]. Please see a copy of my visit note below.      Nephrology Clinic Note  August 16, 2022    I was asked to see this patient by Dr. Howard    CC: Elevated creatinine    HPI: Roger Guillaume is a 51 year old male with PMH significant for allergies, OA  who presents for evaluation and management of elevated creatinine.     Patient endorsed feeling well today. No new symptoms today.  Presented for creatinine elevation as it has been elevated since 2018.  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  Patient denied any systemic symptoms including longstanding rash on his body and/or small joint aches.  Never saw any blood in his urine,  no trouble urinating.  Upon questioning patient workout daily, will do some cardio and also strengthening exercises, more so since his hip replacement in 2018.  Since that time he denied any drastic weight changes.  Patient denied any over-the-counter medications including NSAIDs and/or high-protein shakes or creatinine supplementations.    No known hypertension or diabetes  No history of tobacco or illicit drug or alcohol use    - History of urinary symptoms: no  - History of Hematuria: no  - Swelling: May be a little at the band of socks  - Hx of UTIs: no  - Hx of stones: no  - Rashes/Joint pain: no  - Family hx of kidney disease: None that he knows of  - NSAID use: no       Allergies   Allergen Reactions     Hymenoptera Allergenic Extract [Wasp Venom Protein] Anaphylaxis     BEE serum, Facial swelling     Aspirin Swelling     Facial swelling, possibly, Can take motrin     Salicylates Swelling and Rash       EPINEPHrine (ANY BX GENERIC EQUIV) 0.3 MG/0.3ML injection 2-pack, INJECT 0.3 ML  (0.3 MG TOTAL) AS DIRECTED AS NEEDED FOR ANAPHYLAXIS. INJECT INTO THIGH.  EPINEPHrine (ANY BX GENERIC EQUIV) 0.3 MG/0.3ML injection 2-pack, Inject 0.3 mLs (0.3 mg) into the muscle as needed for anaphylaxis    No current facility-administered medications on file prior to visit.      Past Medical History:   Diagnosis Date     Shingles 2020    Left upper back, no sequela     Torn ligament     left ankle, resolved; ankle surgery       Past Surgical History:   Procedure Laterality Date     ANKLE SURGERY Left     Torn ligaments     TOTAL HIP ARTHROPLASTY Right 12/30/2015     VASECTOMY         Social History     Tobacco Use     Smoking status: Never Smoker     Smokeless tobacco: Never Used   Substance Use Topics     Alcohol use: Yes     Alcohol/week: 2.0 standard drinks     Comment: Rare     Drug use: No       Family History   Problem Relation Age of Onset     Cancer Mother         Bile Duct. Dx 50s       ROS: A 12 system review of systems was negative other than noted here or above.     Exam:  /82   Pulse 57   Ht 1.829 m (6')   Wt 94.8 kg (209 lb)   SpO2 99%   BMI 28.35 kg/m      GENERAL APPEARANCE: alert and no distress  EYES:  no scleral icterus  HENT: No gross abnormality noted  NECK: supple  RESP: lungs clear to auscultation   CV: regular rhythm, normal rate, no rub  Extremities: no clubbing, cyanosis, or edema  SKIN: no rash  NEURO: mentation intact and speech normal  PSYCH: affect normal/bright    Results:    Lab on 08/16/2022   Component Date Value Ref Range Status     Sodium 08/16/2022 142  133 - 144 mmol/L Final     Potassium 08/16/2022 4.5  3.4 - 5.3 mmol/L Final     Chloride 08/16/2022 107  94 - 109 mmol/L Final     Carbon Dioxide (CO2) 08/16/2022 31  20 - 32 mmol/L Final     Anion Gap 08/16/2022 4  3 - 14 mmol/L Final     Urea Nitrogen 08/16/2022 16  7 - 30 mg/dL Final     Creatinine 08/16/2022 1.28 (A) 0.66 - 1.25 mg/dL Final     Calcium 08/16/2022 9.0  8.5 - 10.1 mg/dL Final     Glucose 08/16/2022  92  70 - 99 mg/dL Final     Albumin 08/16/2022 3.9  3.4 - 5.0 g/dL Final     Phosphorus 08/16/2022 2.8  2.5 - 4.5 mg/dL Final     GFR Estimate 08/16/2022 67  >60 mL/min/1.73m2 Final    Effective December 21, 2021 eGFRcr in adults is calculated using the 2021 CKD-EPI creatinine equation which includes age and gender ([place] et al., [place], DOI: 10.[facility]/[id number])     WBC Count 08/16/2022 3.3 (A) 4.0 - 11.0 10e3/uL Final     RBC Count 08/16/2022 5.00  4.40 - 5.90 10e6/uL Final     Hemoglobin 08/16/2022 15.7  13.3 - 17.7 g/dL Final     Hematocrit 08/16/2022 46.4  40.0 - 53.0 % Final     MCV 08/16/2022 93  78 - 100 fL Final     MCH 08/16/2022 31.4  26.5 - 33.0 pg Final     MCHC 08/16/2022 33.8  31.5 - 36.5 g/dL Final     RDW 08/16/2022 12.7  10.0 - 15.0 % Final     Platelet Count 08/16/2022 199  150 - 450 10e3/uL Final     Total Protein Random Urine g/L 08/16/2022 0.07  g/L Final    The reference range has not been established for total protein in random urine samples.  The result should be integrated into the clinical context for interpretation.     Total Protein Urine g/gr Creatinine 08/16/2022 0.06  0.00 - 0.20 g/g Cr Final     Creatinine Urine mg/dL 08/16/2022 125  mg/dL Final     Color Urine 08/16/2022 Yellow  Colorless, Straw, Light Yellow, Yellow Final     Appearance Urine 08/16/2022 Clear  Clear Final     Glucose Urine 08/16/2022 Negative  Negative mg/dL Final     Bilirubin Urine 08/16/2022 Negative  Negative Final     Ketones Urine 08/16/2022 Negative  Negative mg/dL Final     Specific Gravity Urine 08/16/2022 1.015  1.003 - 1.035 Final     Blood Urine 08/16/2022 Trace (A) Negative Final     pH Urine 08/16/2022 7.0  5.0 - 7.0 Final     Protein Albumin Urine 08/16/2022 Negative  Negative mg/dL Final     Urobilinogen Urine 08/16/2022 Normal  Normal, 2.0 mg/dL Final     Nitrite Urine 08/16/2022 Negative  Negative Final     Leukocyte Esterase Urine 08/16/2022 Negative  Negative Final     Mucus Urine 08/16/2022  Present (A) None Seen /LPF Final     RBC Urine 08/16/2022 3 (A) <=2 /HPF Final     WBC Urine 08/16/2022 2  <=5 /HPF Final     Parathyroid Hormone Intact 08/16/2022 47  15 - 65 pg/mL Final       Assessment/Plan:     Creatinine elevation with out diagnosis of CKD  Pt with baseline creatinine of 1.2 to 1.3 since 2018(in care every where as well) with eGFR of high 50's to low 60's. UA was bland so far, but noted to have few RBC in sample that is collected today, UPCR was WNL. No renal imaiging noted so far.  With no significant PMH including hypertension or diabetes or chronic NSAID use and possible increased muscle mass since 2018, considering increased generation than true decrease in creatinine clearance. That said, his UA did had very low level RBC at 3 (combined with mucus, ? Contamination),  Will repeat his labs, if persistent will get serological evaluation for possible underlying GN  - added Cystatin C levels, to compare it with creatinine estimated eGFR,  - will consider 24 hour creatinine clearance based on his cystatin C levels  - will get renal imaging to rule out possible anatomical abnormalities.  - encouraged to be well hydrated  - increase in fresh fruits and vegetables  - continue being physically active    Hypertension :  BP were WNL, not on meds, continue to monitor    Electrolytes :  Stable    BMD :  Goldy, phos, PTH are WNL, awaiting vit D levels    Metabolic acidosis :  Bicarb is  WNL    Anemia :  Hb is WNL    Other problems  Allergies   Anaphylaxis 2/2 bee venous, epi as needed, follow up with PCP.    Total time spent on the day of clinic visit was 45 min including  Face to face time of 18 min, lab and image review, care every where record review, PCP note review and documentation as above.    Suzette Patel  Dept of Nephrology and Hypertension  [facility]

## 2022-08-19 ENCOUNTER — TELEPHONE (OUTPATIENT)
Dept: NEPHROLOGY | Facility: CLINIC | Age: 52
End: 2022-08-19

## 2022-10-02 ENCOUNTER — HEALTH MAINTENANCE LETTER (OUTPATIENT)
Age: 52
End: 2022-10-02

## 2023-04-20 ENCOUNTER — PATIENT OUTREACH (OUTPATIENT)
Dept: CARE COORDINATION | Facility: CLINIC | Age: 53
End: 2023-04-20
Payer: COMMERCIAL

## 2023-05-20 ENCOUNTER — HEALTH MAINTENANCE LETTER (OUTPATIENT)
Age: 53
End: 2023-05-20

## 2023-07-20 ENCOUNTER — MYC MEDICAL ADVICE (OUTPATIENT)
Dept: FAMILY MEDICINE | Facility: CLINIC | Age: 53
End: 2023-07-20
Payer: COMMERCIAL

## 2023-07-20 DIAGNOSIS — J30.9 ALLERGIC RHINITIS, UNSPECIFIED SEASONALITY, UNSPECIFIED TRIGGER: ICD-10-CM

## 2023-07-21 RX ORDER — EPINEPHRINE 0.3 MG/.3ML
INJECTION SUBCUTANEOUS
Qty: 2 EACH | Refills: 1 | Status: SHIPPED | OUTPATIENT
Start: 2023-07-21

## 2023-07-24 NOTE — TELEPHONE ENCOUNTER
"7-24-23  Attempted to call  pt to schedule an appt , pt declined & said \"thank you\" & hung up on me  Krsytal   "

## 2023-08-08 NOTE — PATIENT INSTRUCTIONS - HE
Certainly let me know if despite the treatment for possible prostate infection is still having any discomfort otherwise we should see you sometime this coming winter for a physical.   Quality 226: Preventive Care And Screening: Tobacco Use: Screening And Cessation Intervention: Patient screened for tobacco use and is an ex/non-smoker Detail Level: Detailed

## 2023-08-17 ENCOUNTER — TRANSFERRED RECORDS (OUTPATIENT)
Dept: HEALTH INFORMATION MANAGEMENT | Facility: CLINIC | Age: 53
End: 2023-08-17
Payer: COMMERCIAL

## 2024-02-15 SDOH — HEALTH STABILITY: PHYSICAL HEALTH: ON AVERAGE, HOW MANY DAYS PER WEEK DO YOU ENGAGE IN MODERATE TO STRENUOUS EXERCISE (LIKE A BRISK WALK)?: 7 DAYS

## 2024-02-15 SDOH — HEALTH STABILITY: PHYSICAL HEALTH: ON AVERAGE, HOW MANY MINUTES DO YOU ENGAGE IN EXERCISE AT THIS LEVEL?: 30 MIN

## 2024-02-15 ASSESSMENT — SOCIAL DETERMINANTS OF HEALTH (SDOH): HOW OFTEN DO YOU GET TOGETHER WITH FRIENDS OR RELATIVES?: MORE THAN THREE TIMES A WEEK

## 2024-02-16 DIAGNOSIS — Z13.220 SCREENING FOR LIPID DISORDERS: ICD-10-CM

## 2024-02-16 DIAGNOSIS — Z12.5 SCREENING FOR PROSTATE CANCER: Primary | ICD-10-CM

## 2024-02-19 ENCOUNTER — LAB (OUTPATIENT)
Dept: LAB | Facility: CLINIC | Age: 54
End: 2024-02-19
Payer: COMMERCIAL

## 2024-02-19 DIAGNOSIS — Z13.220 SCREENING FOR LIPID DISORDERS: ICD-10-CM

## 2024-02-19 DIAGNOSIS — Z12.5 SCREENING FOR PROSTATE CANCER: ICD-10-CM

## 2024-02-19 LAB
ANION GAP SERPL CALCULATED.3IONS-SCNC: 9 MMOL/L (ref 7–15)
BUN SERPL-MCNC: 13.2 MG/DL (ref 6–20)
CALCIUM SERPL-MCNC: 9.4 MG/DL (ref 8.6–10)
CHLORIDE SERPL-SCNC: 103 MMOL/L (ref 98–107)
CHOLEST SERPL-MCNC: 210 MG/DL
CREAT SERPL-MCNC: 1.36 MG/DL (ref 0.67–1.17)
DEPRECATED HCO3 PLAS-SCNC: 29 MMOL/L (ref 22–29)
EGFRCR SERPLBLD CKD-EPI 2021: 61 ML/MIN/1.73M2
FASTING STATUS PATIENT QL REPORTED: YES
GLUCOSE SERPL-MCNC: 94 MG/DL (ref 70–99)
HDLC SERPL-MCNC: 80 MG/DL
LDLC SERPL CALC-MCNC: 114 MG/DL
NONHDLC SERPL-MCNC: 130 MG/DL
POTASSIUM SERPL-SCNC: 4.3 MMOL/L (ref 3.4–5.3)
PSA SERPL DL<=0.01 NG/ML-MCNC: 0.89 NG/ML (ref 0–3.5)
SODIUM SERPL-SCNC: 141 MMOL/L (ref 135–145)
TRIGL SERPL-MCNC: 81 MG/DL

## 2024-02-19 PROCEDURE — 80048 BASIC METABOLIC PNL TOTAL CA: CPT

## 2024-02-19 PROCEDURE — 36415 COLL VENOUS BLD VENIPUNCTURE: CPT

## 2024-02-19 PROCEDURE — G0103 PSA SCREENING: HCPCS

## 2024-02-19 PROCEDURE — 80061 LIPID PANEL: CPT

## 2024-02-21 ENCOUNTER — OFFICE VISIT (OUTPATIENT)
Dept: FAMILY MEDICINE | Facility: CLINIC | Age: 54
End: 2024-02-21
Payer: COMMERCIAL

## 2024-02-21 VITALS
HEART RATE: 64 BPM | DIASTOLIC BLOOD PRESSURE: 68 MMHG | OXYGEN SATURATION: 99 % | SYSTOLIC BLOOD PRESSURE: 104 MMHG | WEIGHT: 203 LBS | HEIGHT: 72 IN | TEMPERATURE: 97.1 F | BODY MASS INDEX: 27.5 KG/M2

## 2024-02-21 DIAGNOSIS — H61.23 BILATERAL IMPACTED CERUMEN: ICD-10-CM

## 2024-02-21 DIAGNOSIS — N18.31 STAGE 3A CHRONIC KIDNEY DISEASE (H): ICD-10-CM

## 2024-02-21 DIAGNOSIS — M19.90 OSTEOARTHRITIS, UNSPECIFIED OSTEOARTHRITIS TYPE, UNSPECIFIED SITE: ICD-10-CM

## 2024-02-21 DIAGNOSIS — Z13.6 SCREENING FOR HEART DISEASE: ICD-10-CM

## 2024-02-21 DIAGNOSIS — J30.1 ALLERGIC RHINITIS DUE TO POLLEN, UNSPECIFIED SEASONALITY: ICD-10-CM

## 2024-02-21 DIAGNOSIS — Z00.00 PHYSICAL EXAM: Primary | ICD-10-CM

## 2024-02-21 PROBLEM — N18.9 CHRONIC KIDNEY DISEASE: Status: ACTIVE | Noted: 2024-02-21

## 2024-02-21 PROCEDURE — 99396 PREV VISIT EST AGE 40-64: CPT | Mod: 25 | Performed by: FAMILY MEDICINE

## 2024-02-21 PROCEDURE — 99213 OFFICE O/P EST LOW 20 MIN: CPT | Mod: 25 | Performed by: FAMILY MEDICINE

## 2024-02-21 PROCEDURE — 69209 REMOVE IMPACTED EAR WAX UNI: CPT | Mod: 50 | Performed by: FAMILY MEDICINE

## 2024-02-21 NOTE — PROGRESS NOTES
Preventive Care Visit  [facility] DONNA Howard MD, Family Medicine  Feb 21, 2024      SUBJECTIVE:   Roger is a 53 year old, presenting for the following:  Physical         No data to display              HPI  Patient has a history of right total hip arthroplasty almost 10 years ago he is doing very well he is very active will probably see his orthopedic surgeon in the near future just to make sure things are okay his left hip is doing well.    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  He has very mild seasonal symptoms    Patient has chronic kidney disease his creatinine has been elevated since 2018 he did see nephrology do not really have a good explanation or etiology for this but no major concern he is not on any medication that would elevate the creatinine, his creatinine has been elevated but stable.  I do not think he needs to go back to nephrology as long as her creatinine is stable    The patient has limited family history, and we discussed obtaining a coronary calcium score just for screening purposes he is asymptomatic.    Patient does have some bilateral ceruminosis left worse than right.    Patient overall has extremely good health habits in terms of diet and exercise of note his sugar is well-controlled cholesterol is just barely elevated but I told the patient there may be some genetics behind this.    Patient is up-to-date with all of his immunizations and we have been screening for prostate cancer and the PSA values have been normal               Social History     Tobacco Use    Smoking status: Never    Smokeless tobacco: Never   Substance Use Topics    Alcohol use: Yes     Alcohol/week: 2.0 standard drinks of alcohol     Comment: Rare             2/15/2024    11:51 AM   Alcohol Use   Prescreen: >3 drinks/day or >7 drinks/week? No       Last PSA:   Prostate  Specific Antigen Screen   Date Value Ref Range Status   02/19/2024 0.89 0.00 - 3.50 ng/mL Final   04/18/2022 0.80 0.00 - 3.50 ug/L Final       Reviewed orders with patient. Reviewed health maintenance and updated orders accordingly - Yes  Lab work is in process  Labs reviewed in [facility]  Current Outpatient Medications   Medication Sig Dispense Refill    EPINEPHrine (ANY BX GENERIC EQUIV) 0.3 MG/0.3ML injection 2-pack INJECT 0.3 ML (0.3 MG TOTAL) AS DIRECTED AS NEEDED FOR ANAPHYLAXIS. INJECT INTO THIGH. 2 each 1    EPINEPHrine (ANY BX GENERIC EQUIV) 0.3 MG/0.3ML injection 2-pack Inject 0.3 mLs (0.3 mg) into the muscle as needed for anaphylaxis 2 each 1     Allergies   Allergen Reactions    Hymenoptera Allergenic Extract [Wasp Venom Protein] Anaphylaxis     BEE serum, Facial swelling    Aspirin Swelling     Facial swelling, possibly, Can take motrin    Salicylates Swelling and Rash       Reviewed and updated as needed this visit by clinical staff   Tobacco  Allergies  Meds              Reviewed and updated as needed this visit by Provider                  Past Medical History:   Diagnosis Date    Shingles 2020    Left upper back, no sequela    Torn ligament     left ankle, resolved; ankle surgery      Past Surgical History:   Procedure Laterality Date    ANKLE SURGERY Left     Torn ligaments    TOTAL HIP ARTHROPLASTY Right 12/30/2015    VASECTOMY       Review of Systems    Review of Systems  Constitutional, HEENT, cardiovascular, pulmonary, GI, , musculoskeletal, neuro, skin, endocrine and psych systems are negative, except as otherwise noted.    OBJECTIVE:   /68   Pulse 64   Temp 97.1  F (36.2  C)   Ht 1.829 m (6')   Wt 92.1 kg (203 lb)   SpO2 99%   BMI 27.53 kg/m     Estimated body mass index is 27.53 kg/m  as calculated from the following:    Height as of this encounter: 1.829 m (6').    Weight as of this encounter: 92.1 kg (203 lb).  Physical Exam  GENERAL: alert and no distress  EYES: Eyes grossly  normal to inspection, PERRL and conjunctivae and sclerae normal  HENT: ear canals and TM's normal, nose and mouth without ulcers or lesions  NECK: no adenopathy, no asymmetry, masses, or scars  RESP: lungs clear to auscultation - no rales, rhonchi or wheezes  CV: regular rate and rhythm, normal S1 S2, no S3 or S4, no murmur, click or rub, no peripheral edema  ABDOMEN: soft, nontender, no hepatosplenomegaly, no masses and bowel sounds normal  MS: no gross musculoskeletal defects noted, no edema  SKIN: no suspicious lesions or rashes  NEURO: Normal strength and tone, mentation intact and speech normal  PSYCH: mentation appears normal, affect normal/bright    Diagnostic Test Results:  Labs reviewed in Epic    ASSESSMENT/PLAN:   (Z00.00) Physical exam  (primary encounter diagnosis)  Comment: Overall the patient has extremely good health habits and is in good health  Plan:      (J30.1) Allergic rhinitis  Comment: Symptoms are seasonal and mild  Plan:      (M19.90) Osteoarthritis  Comment: Right hip affected status post right hip arthroplasty doing well.  Plan:      (N18.31) Stage 3a chronic kidney disease (H)  Comment: Since 2018 mild elevation of creatinine, cause somewhat unclear  Plan:      (H61.23) Bilateral impacted cerumen  Comment: Bilateral left worse than right  Plan: REMOVE IMPACTED CERUMEN             (Z13.6) Screening for heart disease  Comment: Limited family history information available, patient is asymptomatic  Plan: CT Coronary Calcium Scan             PLAN:  1.  Bilateral ceruminosis removed by clinical assistant with usual method of water and hydrogen peroxide irrigation  2.  Laboratory studies reviewed  3.  In terms of the kidney function for now watchful waiting I would refer back to nephrology only if there were a significant change in his kidney function  4.  Coronary calcium score  5.  Patient otherwise should be seen yearly            Counseling  Reviewed preventive health counseling, as  reflected in patient instructions       Regular exercise       Healthy diet/nutrition      BMI  Estimated body mass index is 27.53 kg/m  as calculated from the following:    Height as of this encounter: 1.829 m (6').    Weight as of this encounter: 92.1 kg (203 lb).   Weight management plan: Discussed healthy diet and exercise guidelines      He reports that he has never smoked. He has never used smokeless tobacco.            Signed Electronically by: Richard Howard MD

## 2024-03-01 DIAGNOSIS — E78.5 HYPERLIPIDEMIA, UNSPECIFIED HYPERLIPIDEMIA TYPE: Primary | ICD-10-CM

## 2024-07-31 ENCOUNTER — MYC MEDICAL ADVICE (OUTPATIENT)
Dept: FAMILY MEDICINE | Facility: CLINIC | Age: 54
End: 2024-07-31
Payer: COMMERCIAL

## 2025-04-07 ENCOUNTER — MYC MEDICAL ADVICE (OUTPATIENT)
Dept: FAMILY MEDICINE | Facility: CLINIC | Age: 55
End: 2025-04-07
Payer: COMMERCIAL

## 2025-04-08 DIAGNOSIS — N18.31 STAGE 3A CHRONIC KIDNEY DISEASE (H): ICD-10-CM

## 2025-04-08 DIAGNOSIS — Z12.5 SCREENING FOR PROSTATE CANCER: Primary | ICD-10-CM

## 2025-04-08 DIAGNOSIS — Z13.220 SCREENING FOR LIPID DISORDERS: ICD-10-CM

## 2025-04-08 SDOH — HEALTH STABILITY: PHYSICAL HEALTH: ON AVERAGE, HOW MANY MINUTES DO YOU ENGAGE IN EXERCISE AT THIS LEVEL?: 60 MIN

## 2025-04-08 SDOH — HEALTH STABILITY: PHYSICAL HEALTH: ON AVERAGE, HOW MANY DAYS PER WEEK DO YOU ENGAGE IN MODERATE TO STRENUOUS EXERCISE (LIKE A BRISK WALK)?: 7 DAYS

## 2025-04-08 ASSESSMENT — SOCIAL DETERMINANTS OF HEALTH (SDOH): HOW OFTEN DO YOU GET TOGETHER WITH FRIENDS OR RELATIVES?: MORE THAN THREE TIMES A WEEK

## 2025-04-08 NOTE — TELEPHONE ENCOUNTER
04/08/25  Are you willing to place fasting lab orders for pt to do before appt tomorrow 04/09/25?  Mamta

## 2025-04-09 ENCOUNTER — LAB (OUTPATIENT)
Dept: LAB | Facility: CLINIC | Age: 55
End: 2025-04-09
Payer: COMMERCIAL

## 2025-04-09 ENCOUNTER — OFFICE VISIT (OUTPATIENT)
Dept: FAMILY MEDICINE | Facility: CLINIC | Age: 55
End: 2025-04-09
Attending: FAMILY MEDICINE
Payer: COMMERCIAL

## 2025-04-09 VITALS
TEMPERATURE: 97.7 F | OXYGEN SATURATION: 97 % | HEART RATE: 64 BPM | HEIGHT: 71 IN | WEIGHT: 206 LBS | DIASTOLIC BLOOD PRESSURE: 72 MMHG | BODY MASS INDEX: 28.84 KG/M2 | RESPIRATION RATE: 12 BRPM | SYSTOLIC BLOOD PRESSURE: 124 MMHG

## 2025-04-09 DIAGNOSIS — E78.2 MIXED HYPERLIPIDEMIA: ICD-10-CM

## 2025-04-09 DIAGNOSIS — Z13.220 SCREENING FOR LIPID DISORDERS: ICD-10-CM

## 2025-04-09 DIAGNOSIS — N18.31 STAGE 3A CHRONIC KIDNEY DISEASE (H): ICD-10-CM

## 2025-04-09 DIAGNOSIS — Z12.5 SCREENING FOR PROSTATE CANCER: ICD-10-CM

## 2025-04-09 DIAGNOSIS — T63.461A ALLERGIC REACTION TO WASP STING: ICD-10-CM

## 2025-04-09 DIAGNOSIS — Z00.00 PHYSICAL EXAM: Primary | ICD-10-CM

## 2025-04-09 DIAGNOSIS — J30.9 ALLERGIC RHINITIS, UNSPECIFIED SEASONALITY, UNSPECIFIED TRIGGER: ICD-10-CM

## 2025-04-09 PROBLEM — E78.5 HYPERLIPIDEMIA: Status: ACTIVE | Noted: 2025-04-09

## 2025-04-09 LAB
ANION GAP SERPL CALCULATED.3IONS-SCNC: 11 MMOL/L (ref 7–15)
BUN SERPL-MCNC: 15.7 MG/DL (ref 6–20)
CALCIUM SERPL-MCNC: 9.3 MG/DL (ref 8.8–10.4)
CHLORIDE SERPL-SCNC: 103 MMOL/L (ref 98–107)
CHOLEST SERPL-MCNC: 217 MG/DL
CREAT SERPL-MCNC: 1.31 MG/DL (ref 0.67–1.17)
EGFRCR SERPLBLD CKD-EPI 2021: 64 ML/MIN/1.73M2
ERYTHROCYTE [DISTWIDTH] IN BLOOD BY AUTOMATED COUNT: 12.8 % (ref 10–15)
FASTING STATUS PATIENT QL REPORTED: YES
FASTING STATUS PATIENT QL REPORTED: YES
GLUCOSE SERPL-MCNC: 86 MG/DL (ref 70–99)
HCO3 SERPL-SCNC: 25 MMOL/L (ref 22–29)
HCT VFR BLD AUTO: 45.4 % (ref 40–53)
HDLC SERPL-MCNC: 78 MG/DL
HGB BLD-MCNC: 15.6 G/DL (ref 13.3–17.7)
LDLC SERPL CALC-MCNC: 125 MG/DL
MCH RBC QN AUTO: 31.8 PG (ref 26.5–33)
MCHC RBC AUTO-ENTMCNC: 34.4 G/DL (ref 31.5–36.5)
MCV RBC AUTO: 93 FL (ref 78–100)
NONHDLC SERPL-MCNC: 139 MG/DL
PLATELET # BLD AUTO: 209 10E3/UL (ref 150–450)
POTASSIUM SERPL-SCNC: 4.6 MMOL/L (ref 3.4–5.3)
PSA SERPL DL<=0.01 NG/ML-MCNC: 0.73 NG/ML (ref 0–3.5)
RBC # BLD AUTO: 4.9 10E6/UL (ref 4.4–5.9)
SODIUM SERPL-SCNC: 139 MMOL/L (ref 135–145)
TRIGL SERPL-MCNC: 72 MG/DL
WBC # BLD AUTO: 3.4 10E3/UL (ref 4–11)

## 2025-04-09 PROCEDURE — 85027 COMPLETE CBC AUTOMATED: CPT

## 2025-04-09 PROCEDURE — G0103 PSA SCREENING: HCPCS

## 2025-04-09 PROCEDURE — 36415 COLL VENOUS BLD VENIPUNCTURE: CPT

## 2025-04-09 PROCEDURE — G2211 COMPLEX E/M VISIT ADD ON: HCPCS | Performed by: FAMILY MEDICINE

## 2025-04-09 PROCEDURE — 3078F DIAST BP <80 MM HG: CPT | Performed by: FAMILY MEDICINE

## 2025-04-09 PROCEDURE — 80061 LIPID PANEL: CPT

## 2025-04-09 PROCEDURE — 99214 OFFICE O/P EST MOD 30 MIN: CPT | Mod: 25 | Performed by: FAMILY MEDICINE

## 2025-04-09 PROCEDURE — 99396 PREV VISIT EST AGE 40-64: CPT | Performed by: FAMILY MEDICINE

## 2025-04-09 PROCEDURE — 3074F SYST BP LT 130 MM HG: CPT | Performed by: FAMILY MEDICINE

## 2025-04-09 PROCEDURE — 80048 BASIC METABOLIC PNL TOTAL CA: CPT

## 2025-04-09 RX ORDER — EPINEPHRINE 0.3 MG/.3ML
INJECTION SUBCUTANEOUS
Qty: 2 EACH | Refills: 1 | Status: SHIPPED | OUTPATIENT
Start: 2025-04-09

## 2025-04-09 NOTE — PROGRESS NOTES
"Preventive Care Visit  [facility] DONNA Howard MD, Family Medicine  Apr 9, 2025      Assessment & Plan     (Z00.00) Physical exam  (primary encounter diagnosis)  Comment: Overall the patient has extremely good health habits and is in good health  Plan: PRIMARY CARE FOLLOW-UP SCHEDULING             (N18.31) Stage 3a chronic kidney disease (H)  Comment: Stable since 2018  Plan:      (E78.2) Hyperlipidemia  Comment: Elevated attempting to control with diet and exercise  Plan: CT Coronary Calcium Scan             (T63.461A) Allergic reaction to wasp sting  Comment: Known wasp allergy   Plan: EPINEPHrine (ANY BX GENERIC EQUIV) 0.3 MG/0.3ML        injection 2-pack             (J30.9) Allergic rhinitis  Comment: Symptoms are mild  Plan:      PLAN:  1.  Laboratory studies ordered results pending.  2.  I will reorder the coronary calcium scan  3.  Continue the patient on EpiPen  4.  Patient otherwise should be seen yearly            BMI  Estimated body mass index is 28.73 kg/m  as calculated from the following:    Height as of this encounter: 1.803 m (5' 11\").    Weight as of this encounter: 93.4 kg (206 lb).   Weight management plan: Discussed healthy diet and exercise guidelines    Counseling  Appropriate preventive services were addressed with this patient via screening, questionnaire, or discussion as appropriate for fall prevention, nutrition, physical activity, Tobacco-use cessation, social engagement, weight loss and cognition.  Checklist reviewing preventive services available has been given to the patient.  Reviewed patient's diet, addressing concerns and/or questions.   The patient was instructed to see the dentist every 6 months.           Katherine Duran is a 54 year old, presenting for the following:  Physical        4/9/2025    12:44 PM   Additional Questions   Roomed by Andrey FROST  Patient comes in for his annual physical examination.  The patient has a history of chronic " kidney disease stage IIIa, this goes back to 2018 he has been seen by nephrology the reason for the kidney disease is not known but this has been stable as long as it remains stable he does not need to go back to nephrology.    Patient has a history of mild elevation of cholesterol, of note in the past I ordered a coronary calcium test that was not covered I will see if it is covered under the diagnosis of hyperlipidemia which she is attempting to control with diet and exercise    Patient has a diagnosis of a wasp allergy he has an EpiPen that he has available if needed    Overall the patient has extremely good health habits and is in good health, he does have some ceruminosis, he will try to take care of this at home otherwise he has good health habits has never been a smoker drinks alcohol in moderation remains very physically active declines further COVID vaccination.                         Advance Care Planning  Patient does not have a Health Care Directive: Discussed advance care planning with patient; information given to patient to review.      4/8/2025   General Health   How would you rate your overall physical health? Good   Feel stress (tense, anxious, or unable to sleep) Only a little   (!) STRESS CONCERN      4/8/2025   Nutrition   Three or more servings of calcium each day? Yes   Diet: Regular (no restrictions)   How many servings of fruit and vegetables per day? 4 or more   How many sweetened beverages each day? 0-1         4/8/2025   Exercise   Days per week of moderate/strenous exercise 7 days   Average minutes spent exercising at this level 60 min         4/8/2025   Social Factors   Frequency of gathering with friends or relatives More than three times a week   Worry food won't last until get money to buy more No   Food not last or not have enough money for food? No   Do you have housing? (Housing is defined as stable permanent housing and does not include staying ouside in a car, in a tent, in an  abandoned building, in an overnight shelter, or couch-surfing.) Yes   Are you worried about losing your housing? No   Lack of transportation? No   Unable to get utilities (heat,electricity)? No         4/8/2025   Fall Risk   Fallen 2 or more times in the past year? No   Trouble with walking or balance? No          4/8/2025   Dental   Dentist two times every year? (!) NO           2/15/2024   TB Screening   Were you born outside of the [place]? (!) YES              Today's PHQ-2 Score:       4/8/2025     1:47 PM   PHQ-2 ( 1999 [employer])   Q1: Little interest or pleasure in doing things 0   Q2: Feeling down, depressed or hopeless 0   PHQ-2 Score 0    Q1: Little interest or pleasure in doing things Not at all   Q2: Feeling down, depressed or hopeless Not at all   PHQ-2 Score 0       Patient-reported           4/8/2025   Substance Use   Alcohol more than 3/day or more than 7/wk No   Do you use any other substances recreationally? No     Social History     Tobacco Use    Smoking status: Never    Smokeless tobacco: Never   Vaping Use    Vaping status: Never Used   Substance Use Topics    Alcohol use: Yes     Alcohol/week: 2.0 standard drinks of alcohol     Comment: Rare    Drug use: No           4/8/2025   STI Screening   New sexual partner(s) since last STI/HIV test? No   Last PSA:   Prostate Specific Antigen Screen   Date Value Ref Range Status   02/19/2024 0.89 0.00 - 3.50 ng/mL Final   04/18/2022 0.80 0.00 - 3.50 ug/L Final     ASCVD Risk   The 10-year ASCVD risk score (Saeid FLOR, et al., 2019) is: 5.8%    Values used to calculate the score:      Age: 54 years      Sex: Male      Is Non- : Yes      Diabetic: No      Tobacco smoker: No      Systolic Blood Pressure: 124 mmHg      Is BP treated: No      HDL Cholesterol: 80 mg/dL      Total Cholesterol: 210 mg/dL           Reviewed and updated as needed this visit by Provider                    Past Medical History:   Diagnosis Date    Shingles  2020    Left upper back, no sequela    Torn ligament     left ankle, resolved; ankle surgery     Past Surgical History:   Procedure Laterality Date    ANKLE SURGERY Left     Torn ligaments    TOTAL HIP ARTHROPLASTY Right 12/30/2015    VASECTOMY       Lab work is in process  Labs reviewed in EPIC  BP Readings from Last 3 Encounters:   04/09/25 124/72   02/21/24 104/68   08/16/22 122/82    Wt Readings from Last 3 Encounters:   04/09/25 93.4 kg (206 lb)   02/21/24 92.1 kg (203 lb)   08/16/22 94.8 kg (209 lb)                  Patient Active Problem List   Diagnosis    Osteoarthritis    Allergic rhinitis    Allergic reaction to wasp sting    Chronic kidney disease    Stage 3a chronic kidney disease (H)    Hyperlipidemia     Past Surgical History:   Procedure Laterality Date    ANKLE SURGERY Left     Torn ligaments    TOTAL HIP ARTHROPLASTY Right 12/30/2015    VASECTOMY         Social History     Tobacco Use    Smoking status: Never    Smokeless tobacco: Never   Substance Use Topics    Alcohol use: Yes     Alcohol/week: 2.0 standard drinks of alcohol     Comment: Rare     Family History   Problem Relation Age of Onset    Cancer Mother         Bile Duct. Dx 50s    Dementia Maternal Grandfather          Current Outpatient Medications   Medication Sig Dispense Refill    EPINEPHrine (ANY BX GENERIC EQUIV) 0.3 MG/0.3ML injection 2-pack INJECT 0.3 ML (0.3 MG TOTAL) AS DIRECTED AS NEEDED FOR ANAPHYLAXIS. INJECT INTO THIGH. 2 each 1     Allergies   Allergen Reactions    Hymenoptera Allergenic Extract [Wasp Venom Protein] Anaphylaxis     BEE serum, Facial swelling    Aspirin Swelling     Facial swelling, possibly, Can take motrin    Salicylates Swelling and Rash              Review of Systems  Constitutional, HEENT, cardiovascular, pulmonary, GI, , musculoskeletal, neuro, skin, endocrine and psych systems are negative, except as otherwise noted.     Objective    Exam  /72   Pulse 64   Temp 97.7  F (36.5  C) (Oral)    "Resp 12   Ht 1.803 m (5' 11\")   Wt 93.4 kg (206 lb)   SpO2 97%   BMI 28.73 kg/m     Estimated body mass index is 28.73 kg/m  as calculated from the following:    Height as of this encounter: 1.803 m (5' 11\").    Weight as of this encounter: 93.4 kg (206 lb).    Physical Exam  GENERAL: alert and no distress  EYES: Eyes grossly normal to inspection, PERRL and conjunctivae and sclerae normal  HENT: ear canals and TM's normal, nose and mouth without ulcers or lesions  NECK: no adenopathy, no asymmetry, masses, or scars  RESP: lungs clear to auscultation - no rales, rhonchi or wheezes  CV: regular rate and rhythm, normal S1 S2, no S3 or S4, no murmur, click or rub, no peripheral edema  ABDOMEN: soft, nontender, no hepatosplenomegaly, no masses and bowel sounds normal  MS: no gross musculoskeletal defects noted, no edema  SKIN: no suspicious lesions or rashes  NEURO: Normal strength and tone, mentation intact and speech normal  PSYCH: mentation appears normal, affect normal/bright        Signed Electronically by: Richard Howard MD    "

## 2025-05-19 ENCOUNTER — HOSPITAL ENCOUNTER (OUTPATIENT)
Dept: CT IMAGING | Facility: CLINIC | Age: 55
Discharge: HOME | End: 2025-05-19
Attending: FAMILY MEDICINE | Admitting: FAMILY MEDICINE
Payer: COMMERCIAL

## 2025-05-19 ENCOUNTER — RESULTS FOLLOW-UP (OUTPATIENT)
Dept: FAMILY MEDICINE | Facility: CLINIC | Age: 55
End: 2025-05-19

## 2025-05-19 DIAGNOSIS — E78.2 MIXED HYPERLIPIDEMIA: ICD-10-CM

## 2025-05-19 LAB
CV CALCIUM SCORE AGATSTON LM: 0
CV CALCIUM SCORING AGATSON LAD: 0
CV CALCIUM SCORING AGATSTON CX: 0
CV CALCIUM SCORING AGATSTON RCA: 0
CV CALCIUM SCORING AGATSTON TOTAL: 0

## 2025-05-19 PROCEDURE — 75571 CT HRT W/O DYE W/CA TEST: CPT

## 2025-05-19 PROCEDURE — 75571 CT HRT W/O DYE W/CA TEST: CPT | Mod: 26 | Performed by: INTERNAL MEDICINE
